# Patient Record
Sex: FEMALE | Race: ASIAN | NOT HISPANIC OR LATINO | URBAN - METROPOLITAN AREA
[De-identification: names, ages, dates, MRNs, and addresses within clinical notes are randomized per-mention and may not be internally consistent; named-entity substitution may affect disease eponyms.]

---

## 2018-09-26 ENCOUNTER — EMERGENCY (EMERGENCY)
Facility: HOSPITAL | Age: 33
LOS: 1 days | Discharge: HOME | End: 2018-09-26
Attending: STUDENT IN AN ORGANIZED HEALTH CARE EDUCATION/TRAINING PROGRAM
Payer: COMMERCIAL

## 2018-09-26 VITALS
TEMPERATURE: 99 F | DIASTOLIC BLOOD PRESSURE: 71 MMHG | HEART RATE: 83 BPM | OXYGEN SATURATION: 100 % | RESPIRATION RATE: 16 BRPM | SYSTOLIC BLOOD PRESSURE: 99 MMHG

## 2018-09-26 VITALS
SYSTOLIC BLOOD PRESSURE: 106 MMHG | HEART RATE: 80 BPM | RESPIRATION RATE: 20 BRPM | OXYGEN SATURATION: 99 % | DIASTOLIC BLOOD PRESSURE: 70 MMHG

## 2018-09-26 LAB
ALBUMIN SERPL ELPH-MCNC: 4 G/DL — SIGNIFICANT CHANGE UP (ref 3.5–5)
ALP SERPL-CCNC: 41 U/L — SIGNIFICANT CHANGE UP (ref 40–120)
ALT FLD-CCNC: 25 U/L DA — SIGNIFICANT CHANGE UP (ref 10–60)
ANION GAP SERPL CALC-SCNC: 8 MMOL/L — SIGNIFICANT CHANGE UP (ref 5–17)
APPEARANCE UR: CLEAR — SIGNIFICANT CHANGE UP
AST SERPL-CCNC: 11 U/L — SIGNIFICANT CHANGE UP (ref 10–40)
BASOPHILS # BLD AUTO: 0.1 K/UL — SIGNIFICANT CHANGE UP (ref 0–0.2)
BASOPHILS NFR BLD AUTO: 0.5 % — SIGNIFICANT CHANGE UP (ref 0–2)
BILIRUB SERPL-MCNC: 0.4 MG/DL — SIGNIFICANT CHANGE UP (ref 0.2–1.2)
BILIRUB UR-MCNC: NEGATIVE — SIGNIFICANT CHANGE UP
BUN SERPL-MCNC: 8 MG/DL — SIGNIFICANT CHANGE UP (ref 7–18)
CALCIUM SERPL-MCNC: 9.4 MG/DL — SIGNIFICANT CHANGE UP (ref 8.4–10.5)
CHLORIDE SERPL-SCNC: 104 MMOL/L — SIGNIFICANT CHANGE UP (ref 96–108)
CO2 SERPL-SCNC: 25 MMOL/L — SIGNIFICANT CHANGE UP (ref 22–31)
COLOR SPEC: YELLOW — SIGNIFICANT CHANGE UP
CREAT SERPL-MCNC: 0.61 MG/DL — SIGNIFICANT CHANGE UP (ref 0.5–1.3)
DIFF PNL FLD: NEGATIVE — SIGNIFICANT CHANGE UP
EOSINOPHIL # BLD AUTO: 0.1 K/UL — SIGNIFICANT CHANGE UP (ref 0–0.5)
EOSINOPHIL NFR BLD AUTO: 0.7 % — SIGNIFICANT CHANGE UP (ref 0–6)
GLUCOSE SERPL-MCNC: 91 MG/DL — SIGNIFICANT CHANGE UP (ref 70–99)
GLUCOSE UR QL: NEGATIVE — SIGNIFICANT CHANGE UP
HCG SERPL-ACNC: HIGH MIU/ML
HCT VFR BLD CALC: 39.1 % — SIGNIFICANT CHANGE UP (ref 34.5–45)
HGB BLD-MCNC: 13 G/DL — SIGNIFICANT CHANGE UP (ref 11.5–15.5)
KETONES UR-MCNC: ABNORMAL
LEUKOCYTE ESTERASE UR-ACNC: ABNORMAL
LYMPHOCYTES # BLD AUTO: 15 % — SIGNIFICANT CHANGE UP (ref 13–44)
LYMPHOCYTES # BLD AUTO: 2.5 K/UL — SIGNIFICANT CHANGE UP (ref 1–3.3)
MCHC RBC-ENTMCNC: 28.9 PG — SIGNIFICANT CHANGE UP (ref 27–34)
MCHC RBC-ENTMCNC: 33.2 GM/DL — SIGNIFICANT CHANGE UP (ref 32–36)
MCV RBC AUTO: 87.3 FL — SIGNIFICANT CHANGE UP (ref 80–100)
MONOCYTES # BLD AUTO: 0.8 K/UL — SIGNIFICANT CHANGE UP (ref 0–0.9)
MONOCYTES NFR BLD AUTO: 5 % — SIGNIFICANT CHANGE UP (ref 2–14)
NEUTROPHILS # BLD AUTO: 12.9 K/UL — HIGH (ref 1.8–7.4)
NEUTROPHILS NFR BLD AUTO: 78.7 % — HIGH (ref 43–77)
NITRITE UR-MCNC: NEGATIVE — SIGNIFICANT CHANGE UP
PH UR: 7 — SIGNIFICANT CHANGE UP (ref 5–8)
PLATELET # BLD AUTO: 317 K/UL — SIGNIFICANT CHANGE UP (ref 150–400)
POTASSIUM SERPL-MCNC: 4.7 MMOL/L — SIGNIFICANT CHANGE UP (ref 3.5–5.3)
POTASSIUM SERPL-SCNC: 4.7 MMOL/L — SIGNIFICANT CHANGE UP (ref 3.5–5.3)
PROT SERPL-MCNC: 8.1 G/DL — SIGNIFICANT CHANGE UP (ref 6–8.3)
PROT UR-MCNC: NEGATIVE — SIGNIFICANT CHANGE UP
RBC # BLD: 4.48 M/UL — SIGNIFICANT CHANGE UP (ref 3.8–5.2)
RBC # FLD: 11 % — SIGNIFICANT CHANGE UP (ref 10.3–14.5)
SODIUM SERPL-SCNC: 137 MMOL/L — SIGNIFICANT CHANGE UP (ref 135–145)
SP GR SPEC: 1.01 — SIGNIFICANT CHANGE UP (ref 1.01–1.02)
UROBILINOGEN FLD QL: NEGATIVE — SIGNIFICANT CHANGE UP
WBC # BLD: 16.4 K/UL — HIGH (ref 3.8–10.5)
WBC # FLD AUTO: 16.4 K/UL — HIGH (ref 3.8–10.5)

## 2018-09-26 PROCEDURE — 99284 EMERGENCY DEPT VISIT MOD MDM: CPT | Mod: 25

## 2018-09-26 PROCEDURE — 96374 THER/PROPH/DIAG INJ IV PUSH: CPT

## 2018-09-26 PROCEDURE — 85027 COMPLETE CBC AUTOMATED: CPT

## 2018-09-26 PROCEDURE — 80053 COMPREHEN METABOLIC PANEL: CPT

## 2018-09-26 PROCEDURE — 99285 EMERGENCY DEPT VISIT HI MDM: CPT

## 2018-09-26 PROCEDURE — 81001 URINALYSIS AUTO W/SCOPE: CPT

## 2018-09-26 PROCEDURE — 87086 URINE CULTURE/COLONY COUNT: CPT

## 2018-09-26 PROCEDURE — 84702 CHORIONIC GONADOTROPIN TEST: CPT

## 2018-09-26 PROCEDURE — 96361 HYDRATE IV INFUSION ADD-ON: CPT

## 2018-09-26 RX ORDER — PYRIDOXINE HCL (VITAMIN B6) 100 MG
1 TABLET ORAL
Qty: 12 | Refills: 0
Start: 2018-09-26 | End: 2018-09-29

## 2018-09-26 RX ORDER — SODIUM CHLORIDE 9 MG/ML
1000 INJECTION, SOLUTION INTRAVENOUS
Qty: 0 | Refills: 0 | Status: DISCONTINUED | OUTPATIENT
Start: 2018-09-26 | End: 2018-09-30

## 2018-09-26 RX ORDER — METOCLOPRAMIDE HCL 10 MG
10 TABLET ORAL ONCE
Qty: 0 | Refills: 0 | Status: COMPLETED | OUTPATIENT
Start: 2018-09-26 | End: 2018-09-26

## 2018-09-26 RX ORDER — SODIUM CHLORIDE 9 MG/ML
1000 INJECTION INTRAMUSCULAR; INTRAVENOUS; SUBCUTANEOUS ONCE
Qty: 0 | Refills: 0 | Status: COMPLETED | OUTPATIENT
Start: 2018-09-26 | End: 2018-09-26

## 2018-09-26 RX ORDER — NITROFURANTOIN MACROCRYSTAL 50 MG
1 CAPSULE ORAL
Qty: 14 | Refills: 0 | OUTPATIENT
Start: 2018-09-26 | End: 2018-10-02

## 2018-09-26 RX ADMIN — Medication 10 MILLIGRAM(S): at 17:27

## 2018-09-26 RX ADMIN — SODIUM CHLORIDE 1000 MILLILITER(S): 9 INJECTION INTRAMUSCULAR; INTRAVENOUS; SUBCUTANEOUS at 18:43

## 2018-09-26 RX ADMIN — SODIUM CHLORIDE 1000 MILLILITER(S): 9 INJECTION INTRAMUSCULAR; INTRAVENOUS; SUBCUTANEOUS at 17:27

## 2018-09-26 RX ADMIN — SODIUM CHLORIDE 125 MILLILITER(S): 9 INJECTION, SOLUTION INTRAVENOUS at 18:43

## 2018-09-26 NOTE — ED ADULT NURSE NOTE - OBJECTIVE STATEMENT
Pt. is c/o nausea vomiting since Monday, Pt. states she is 6 weeks pregnant. Pt. denies any abdominal pain or vaginal bleeding.

## 2018-09-26 NOTE — ED ADULT NURSE NOTE - NSIMPLEMENTINTERV_GEN_ALL_ED
[facility] Pharmacy Note:  Pain Consult    Debora Betancourt Kelly Renee is a 26year old female started on Dilaudid PCA by Dr. Bennett Andrews. Pharmacy was consulted to review medication profile and to discontinue previously ordered narcotics and sedatives.     Medication profile w Implemented All Universal Safety Interventions:  [place] to call system. Call bell, personal items and telephone within reach. Instruct patient to call for assistance. Room bathroom lighting operational. Non-slip footwear when patient is off stretcher. Physically safe environment: no spills, clutter or unnecessary equipment. Stretcher in lowest position, wheels locked, appropriate side rails in place.

## 2018-09-26 NOTE — ED STATDOCS - OBJECTIVE STATEMENT
Telemedicine assessment was conducted (using real time 2 way audio-video technology) by Dr. Shayy Almaguer located at [address] [place] [id number]  +++++++++++++++++++++++++++++++++++++++++++++++++++  History and Plan: 33  y/o F with no significant PMHx or PSHx presents to the ED at 6 weeks gestation with complaints of nausea. Patient reports decreased eating and drinking x 4 days. Reports vomiting more than 6 times a day. Patient had ultrasound done 4 days ago which was reportedly normal. Patient advised to return next week. Patient denies abdominal pain, vaginal bleeding, dysuria or any other complaints. NKDA. Patient taking Diclegis without relief of symptoms. NKDA.  Plan: Give IV fluids, check labs for electrolyte abnormality. Telemedicine assessment was conducted (using real time 2 way audio-video technology) by Dr. Shayy Almaguer located at [address] [place] [id number]  +++++++++++++++++++++++++++++++++++++++++++++++++++  History and Plan: 33  y/o F with no significant PMHx or PSHx presents to the ED at 6 weeks gestation with complaints of nausea and vomiting, multiple episodes daily x 4 days. Pt unable to tolerate po. Reports vomiting more than 6 times a day. Patient had ultrasound done 4 days ago at [facility] which was reportedly normal. Patient advised to return next week for repeat sono. Patient denies abdominal pain, vaginal bleeding, dysuria or any other complaints. NKDA. Patient taking Diclegis without relief of symptoms. NKDA.  On virtual exam pt not in any acute distress and on self exam no abdo ttp.  Plan: Give IV fluids, check labs for electrolyte abnormality, UA, reassess. If pt with abdo pain or vag bleeding will consider TVUS as no documented US on record here.

## 2018-09-26 NOTE — ED PROVIDER NOTE - OBJECTIVE STATEMENT
32 y/o F pt w/ no hx, 6 weeks pregnant, presents c/o nausea, emesis, and dizziness x 3 days. Reports about 10 reports of emesis a day. Reports that she cannot eat. Denies pain, and any other complaints. NKDA.

## 2018-09-26 NOTE — ED PROVIDER NOTE - PROGRESS NOTE DETAILS
patient tolerating po. no abd pain. eelevated wbc likely 2.2 to pregnancy. medication sent to pharmacy.

## 2018-09-26 NOTE — ED PROVIDER NOTE - MEDICAL DECISION MAKING DETAILS
6 weeks pregnant pt w/ emesis, nausea, dizziness. VSS. Well appearing. Will give IV hydration, get UA, and anti-emetics. Reassess.

## 2018-09-27 LAB
CULTURE RESULTS: NO GROWTH — SIGNIFICANT CHANGE UP
SPECIMEN SOURCE: SIGNIFICANT CHANGE UP

## 2018-09-28 ENCOUNTER — EMERGENCY (EMERGENCY)
Facility: HOSPITAL | Age: 33
LOS: 1 days | Discharge: HOME | End: 2018-09-28
Attending: EMERGENCY MEDICINE
Payer: COMMERCIAL

## 2018-09-28 VITALS
OXYGEN SATURATION: 98 % | DIASTOLIC BLOOD PRESSURE: 62 MMHG | TEMPERATURE: 98 F | HEART RATE: 85 BPM | RESPIRATION RATE: 16 BRPM | SYSTOLIC BLOOD PRESSURE: 116 MMHG

## 2018-09-28 VITALS — HEIGHT: 64 IN | WEIGHT: 145.06 LBS

## 2018-09-28 LAB
ALBUMIN SERPL ELPH-MCNC: 4 G/DL — SIGNIFICANT CHANGE UP (ref 3.5–5)
ALP SERPL-CCNC: 42 U/L — SIGNIFICANT CHANGE UP (ref 40–120)
ALT FLD-CCNC: 30 U/L DA — SIGNIFICANT CHANGE UP (ref 10–60)
ANION GAP SERPL CALC-SCNC: 7 MMOL/L — SIGNIFICANT CHANGE UP (ref 5–17)
APPEARANCE UR: CLEAR — SIGNIFICANT CHANGE UP
AST SERPL-CCNC: 18 U/L — SIGNIFICANT CHANGE UP (ref 10–40)
BASOPHILS # BLD AUTO: 0.1 K/UL — SIGNIFICANT CHANGE UP (ref 0–0.2)
BASOPHILS NFR BLD AUTO: 0.3 % — SIGNIFICANT CHANGE UP (ref 0–2)
BILIRUB SERPL-MCNC: 0.5 MG/DL — SIGNIFICANT CHANGE UP (ref 0.2–1.2)
BILIRUB UR-MCNC: NEGATIVE — SIGNIFICANT CHANGE UP
BUN SERPL-MCNC: 6 MG/DL — LOW (ref 7–18)
CALCIUM SERPL-MCNC: 9.9 MG/DL — SIGNIFICANT CHANGE UP (ref 8.4–10.5)
CHLORIDE SERPL-SCNC: 103 MMOL/L — SIGNIFICANT CHANGE UP (ref 96–108)
CO2 SERPL-SCNC: 26 MMOL/L — SIGNIFICANT CHANGE UP (ref 22–31)
COLOR SPEC: YELLOW — SIGNIFICANT CHANGE UP
CREAT SERPL-MCNC: 0.55 MG/DL — SIGNIFICANT CHANGE UP (ref 0.5–1.3)
DIFF PNL FLD: NEGATIVE — SIGNIFICANT CHANGE UP
EOSINOPHIL # BLD AUTO: 0 K/UL — SIGNIFICANT CHANGE UP (ref 0–0.5)
EOSINOPHIL NFR BLD AUTO: 0.1 % — SIGNIFICANT CHANGE UP (ref 0–6)
GLUCOSE SERPL-MCNC: 86 MG/DL — SIGNIFICANT CHANGE UP (ref 70–99)
GLUCOSE UR QL: NEGATIVE — SIGNIFICANT CHANGE UP
HCG SERPL-ACNC: HIGH MIU/ML
HCT VFR BLD CALC: 38.3 % — SIGNIFICANT CHANGE UP (ref 34.5–45)
HGB BLD-MCNC: 12.9 G/DL — SIGNIFICANT CHANGE UP (ref 11.5–15.5)
KETONES UR-MCNC: ABNORMAL
LEUKOCYTE ESTERASE UR-ACNC: ABNORMAL
LYMPHOCYTES # BLD AUTO: 1.1 K/UL — SIGNIFICANT CHANGE UP (ref 1–3.3)
LYMPHOCYTES # BLD AUTO: 6.6 % — LOW (ref 13–44)
MAGNESIUM SERPL-MCNC: 1.8 MG/DL — SIGNIFICANT CHANGE UP (ref 1.6–2.6)
MCHC RBC-ENTMCNC: 29.2 PG — SIGNIFICANT CHANGE UP (ref 27–34)
MCHC RBC-ENTMCNC: 33.6 GM/DL — SIGNIFICANT CHANGE UP (ref 32–36)
MCV RBC AUTO: 87.1 FL — SIGNIFICANT CHANGE UP (ref 80–100)
MONOCYTES # BLD AUTO: 0.6 K/UL — SIGNIFICANT CHANGE UP (ref 0–0.9)
MONOCYTES NFR BLD AUTO: 3.6 % — SIGNIFICANT CHANGE UP (ref 2–14)
NEUTROPHILS # BLD AUTO: 14.8 K/UL — HIGH (ref 1.8–7.4)
NEUTROPHILS NFR BLD AUTO: 89.4 % — HIGH (ref 43–77)
NITRITE UR-MCNC: NEGATIVE — SIGNIFICANT CHANGE UP
PH UR: 8 — SIGNIFICANT CHANGE UP (ref 5–8)
PLATELET # BLD AUTO: 315 K/UL — SIGNIFICANT CHANGE UP (ref 150–400)
POTASSIUM SERPL-MCNC: 4.6 MMOL/L — SIGNIFICANT CHANGE UP (ref 3.5–5.3)
POTASSIUM SERPL-SCNC: 4.6 MMOL/L — SIGNIFICANT CHANGE UP (ref 3.5–5.3)
PROT SERPL-MCNC: 8.2 G/DL — SIGNIFICANT CHANGE UP (ref 6–8.3)
PROT UR-MCNC: NEGATIVE — SIGNIFICANT CHANGE UP
RBC # BLD: 4.4 M/UL — SIGNIFICANT CHANGE UP (ref 3.8–5.2)
RBC # FLD: 11 % — SIGNIFICANT CHANGE UP (ref 10.3–14.5)
SODIUM SERPL-SCNC: 136 MMOL/L — SIGNIFICANT CHANGE UP (ref 135–145)
SP GR SPEC: 1.01 — SIGNIFICANT CHANGE UP (ref 1.01–1.02)
UROBILINOGEN FLD QL: NEGATIVE — SIGNIFICANT CHANGE UP
WBC # BLD: 16.6 K/UL — HIGH (ref 3.8–10.5)
WBC # FLD AUTO: 16.6 K/UL — HIGH (ref 3.8–10.5)

## 2018-09-28 PROCEDURE — 80053 COMPREHEN METABOLIC PANEL: CPT

## 2018-09-28 PROCEDURE — 99284 EMERGENCY DEPT VISIT MOD MDM: CPT | Mod: 25

## 2018-09-28 PROCEDURE — 84702 CHORIONIC GONADOTROPIN TEST: CPT

## 2018-09-28 PROCEDURE — 85027 COMPLETE CBC AUTOMATED: CPT

## 2018-09-28 PROCEDURE — 87086 URINE CULTURE/COLONY COUNT: CPT

## 2018-09-28 PROCEDURE — 81001 URINALYSIS AUTO W/SCOPE: CPT

## 2018-09-28 PROCEDURE — 99284 EMERGENCY DEPT VISIT MOD MDM: CPT

## 2018-09-28 PROCEDURE — 96376 TX/PRO/DX INJ SAME DRUG ADON: CPT

## 2018-09-28 PROCEDURE — 83735 ASSAY OF MAGNESIUM: CPT

## 2018-09-28 PROCEDURE — 96374 THER/PROPH/DIAG INJ IV PUSH: CPT

## 2018-09-28 PROCEDURE — 96375 TX/PRO/DX INJ NEW DRUG ADDON: CPT

## 2018-09-28 RX ORDER — ONDANSETRON 8 MG/1
4 TABLET, FILM COATED ORAL ONCE
Qty: 0 | Refills: 0 | Status: COMPLETED | OUTPATIENT
Start: 2018-09-28 | End: 2018-09-28

## 2018-09-28 RX ORDER — CEFTRIAXONE 500 MG/1
1 INJECTION, POWDER, FOR SOLUTION INTRAMUSCULAR; INTRAVENOUS ONCE
Qty: 0 | Refills: 0 | Status: COMPLETED | OUTPATIENT
Start: 2018-09-28 | End: 2018-09-28

## 2018-09-28 RX ORDER — ONDANSETRON 8 MG/1
1 TABLET, FILM COATED ORAL
Qty: 15 | Refills: 0
Start: 2018-09-28 | End: 2018-10-02

## 2018-09-28 RX ORDER — SODIUM CHLORIDE 9 MG/ML
2000 INJECTION INTRAMUSCULAR; INTRAVENOUS; SUBCUTANEOUS ONCE
Qty: 0 | Refills: 0 | Status: COMPLETED | OUTPATIENT
Start: 2018-09-28 | End: 2018-09-28

## 2018-09-28 RX ADMIN — ONDANSETRON 4 MILLIGRAM(S): 8 TABLET, FILM COATED ORAL at 16:09

## 2018-09-28 RX ADMIN — SODIUM CHLORIDE 2000 MILLILITER(S): 9 INJECTION INTRAMUSCULAR; INTRAVENOUS; SUBCUTANEOUS at 14:34

## 2018-09-28 RX ADMIN — ONDANSETRON 4 MILLIGRAM(S): 8 TABLET, FILM COATED ORAL at 14:39

## 2018-09-28 RX ADMIN — CEFTRIAXONE 100 GRAM(S): 500 INJECTION, POWDER, FOR SOLUTION INTRAMUSCULAR; INTRAVENOUS at 16:09

## 2018-09-28 NOTE — ED ADULT NURSE NOTE - NSIMPLEMENTINTERV_GEN_ALL_ED
Implemented All Universal Safety Interventions:  [place] to call system. Call bell, personal items and telephone within reach. Instruct patient to call for assistance. Room bathroom lighting operational. Non-slip footwear when patient is off stretcher. Physically safe environment: no spills, clutter or unnecessary equipment. Stretcher in lowest position, wheels locked, appropriate side rails in place.

## 2018-09-28 NOTE — ED PROVIDER NOTE - CARE PLAN
Principal Discharge DX:	Hyperemesis gravidarum Principal Discharge DX:	Hyperemesis gravidarum  Secondary Diagnosis:	Bacteriuria, asymptomatic, in pregnancy

## 2018-09-28 NOTE — ED PROVIDER NOTE - MEDICAL DECISION MAKING DETAILS
patient pw hyperemesis gravidarum. will start on fluids and anti-emetic's. given she has failed vitb6, will start on zofran. risks to fetus d/w patient and she agrees to take the medication. patient pw hyperemesis gravidarum. will start on fluids and anti-emetic's. given she has failed vitb6, will start on zofran. risks to fetus d/w patient and she agrees to take the medication. will give iv dosage abx for uti. patient okay for dc home.

## 2018-09-28 NOTE — ED ADULT NURSE NOTE - OBJECTIVE STATEMENT
6 weeks pregnant came in c/o vomiting was diagnosed with hyper emesis gravidarum, gerber any vaginal bleeding abdominal pain or cramps

## 2018-09-28 NOTE — ED PROVIDER NOTE - OBJECTIVE STATEMENT
Pertinent PMH/PSH/FHx/SHx and Review of Systems contained within:  33F no med hx g1 at 7 weeks pw nausea and vomiting. Seen on the 27th and dxed with hyperemesis gravidarum. patient notes initial relief and she was sent home on pyridoxime. symptoms returned. patient notes having very little in her system. no fever, chills, dysuria, vaginal bleeding, weakness, cp, sob, rash, dizziness   Fh and Sh not otherwise contributory  ROS otherwise negative Pertinent PMH/PSH/FHx/SHx and Review of Systems contained within:  33F no med hx g1 at 7 weeks pw nausea and vomiting. Seen on the 27th and dxed with hyperemesis gravidarum. patient notes initial relief and she was sent home on pyridoxime and diclegis. symptoms returned. patient notes having very little in her system. no fever, chills, dysuria, vaginal bleeding, weakness, cp, sob, rash, dizziness. she denies dysuria but was notably started on macrobid, which she did not take 2/2 nausea.   Fh and Sh not otherwise contributory  ROS otherwise negative

## 2018-09-29 LAB
CULTURE RESULTS: NO GROWTH — SIGNIFICANT CHANGE UP
SPECIMEN SOURCE: SIGNIFICANT CHANGE UP

## 2018-10-03 ENCOUNTER — EMERGENCY (EMERGENCY)
Facility: HOSPITAL | Age: 33
LOS: 1 days | Discharge: HOME | End: 2018-10-03
Attending: EMERGENCY MEDICINE | Admitting: EMERGENCY MEDICINE
Payer: COMMERCIAL

## 2018-10-03 VITALS
TEMPERATURE: 99 F | RESPIRATION RATE: 18 BRPM | HEART RATE: 84 BPM | DIASTOLIC BLOOD PRESSURE: 72 MMHG | SYSTOLIC BLOOD PRESSURE: 105 MMHG | OXYGEN SATURATION: 100 %

## 2018-10-03 VITALS
DIASTOLIC BLOOD PRESSURE: 81 MMHG | SYSTOLIC BLOOD PRESSURE: 130 MMHG | HEART RATE: 82 BPM | OXYGEN SATURATION: 100 % | RESPIRATION RATE: 16 BRPM | TEMPERATURE: 98 F

## 2018-10-03 LAB
BUN SERPL-MCNC: 7 MG/DL — SIGNIFICANT CHANGE UP (ref 7–23)
CALCIUM SERPL-MCNC: 9.3 MG/DL — SIGNIFICANT CHANGE UP (ref 8.4–10.5)
CHLORIDE SERPL-SCNC: 97 MMOL/L — LOW (ref 98–107)
CO2 SERPL-SCNC: 24 MMOL/L — SIGNIFICANT CHANGE UP (ref 22–31)
CREAT SERPL-MCNC: 0.64 MG/DL — SIGNIFICANT CHANGE UP (ref 0.5–1.3)
GLUCOSE SERPL-MCNC: 86 MG/DL — SIGNIFICANT CHANGE UP (ref 70–99)
HCT VFR BLD CALC: 36.9 % — SIGNIFICANT CHANGE UP (ref 34.5–45)
HGB BLD-MCNC: 12.7 G/DL — SIGNIFICANT CHANGE UP (ref 11.5–15.5)
MAGNESIUM SERPL-MCNC: 1.9 MG/DL — SIGNIFICANT CHANGE UP (ref 1.6–2.6)
MCHC RBC-ENTMCNC: 29.6 PG — SIGNIFICANT CHANGE UP (ref 27–34)
MCHC RBC-ENTMCNC: 34.4 % — SIGNIFICANT CHANGE UP (ref 32–36)
MCV RBC AUTO: 86 FL — SIGNIFICANT CHANGE UP (ref 80–100)
NRBC # FLD: 0 — SIGNIFICANT CHANGE UP
PHOSPHATE SERPL-MCNC: 3.8 MG/DL — SIGNIFICANT CHANGE UP (ref 2.5–4.5)
PLATELET # BLD AUTO: 313 K/UL — SIGNIFICANT CHANGE UP (ref 150–400)
PMV BLD: 9.7 FL — SIGNIFICANT CHANGE UP (ref 7–13)
POTASSIUM SERPL-MCNC: 4 MMOL/L — SIGNIFICANT CHANGE UP (ref 3.5–5.3)
POTASSIUM SERPL-SCNC: 4 MMOL/L — SIGNIFICANT CHANGE UP (ref 3.5–5.3)
RBC # BLD: 4.29 M/UL — SIGNIFICANT CHANGE UP (ref 3.8–5.2)
RBC # FLD: 12 % — SIGNIFICANT CHANGE UP (ref 10.3–14.5)
SODIUM SERPL-SCNC: 133 MMOL/L — LOW (ref 135–145)
WBC # BLD: 14.1 K/UL — HIGH (ref 3.8–10.5)
WBC # FLD AUTO: 14.1 K/UL — HIGH (ref 3.8–10.5)

## 2018-10-03 PROCEDURE — 99284 EMERGENCY DEPT VISIT MOD MDM: CPT | Mod: 25

## 2018-10-03 PROCEDURE — 76815 OB US LIMITED FETUS(S): CPT | Mod: 26

## 2018-10-03 RX ORDER — METOCLOPRAMIDE HCL 10 MG
1 TABLET ORAL
Qty: 21 | Refills: 0
Start: 2018-10-03 | End: 2018-10-09

## 2018-10-03 RX ORDER — SODIUM CHLORIDE 9 MG/ML
2000 INJECTION INTRAMUSCULAR; INTRAVENOUS; SUBCUTANEOUS ONCE
Qty: 0 | Refills: 0 | Status: COMPLETED | OUTPATIENT
Start: 2018-10-03 | End: 2018-10-03

## 2018-10-03 RX ORDER — SODIUM CHLORIDE 9 MG/ML
2000 INJECTION, SOLUTION INTRAVENOUS ONCE
Qty: 0 | Refills: 0 | Status: COMPLETED | OUTPATIENT
Start: 2018-10-03 | End: 2018-10-03

## 2018-10-03 RX ORDER — METOCLOPRAMIDE HCL 10 MG
10 TABLET ORAL ONCE
Qty: 0 | Refills: 0 | Status: COMPLETED | OUTPATIENT
Start: 2018-10-03 | End: 2018-10-03

## 2018-10-03 RX ORDER — ONDANSETRON 8 MG/1
4 TABLET, FILM COATED ORAL ONCE
Qty: 0 | Refills: 0 | Status: COMPLETED | OUTPATIENT
Start: 2018-10-03 | End: 2018-10-03

## 2018-10-03 RX ADMIN — SODIUM CHLORIDE 1000 MILLILITER(S): 9 INJECTION, SOLUTION INTRAVENOUS at 15:54

## 2018-10-03 RX ADMIN — SODIUM CHLORIDE 1000 MILLILITER(S): 9 INJECTION INTRAMUSCULAR; INTRAVENOUS; SUBCUTANEOUS at 13:27

## 2018-10-03 RX ADMIN — Medication 10 MILLIGRAM(S): at 15:54

## 2018-10-03 RX ADMIN — ONDANSETRON 4 MILLIGRAM(S): 8 TABLET, FILM COATED ORAL at 13:27

## 2018-10-03 NOTE — ED PROVIDER NOTE - MEDICAL DECISION MAKING DETAILS
Polo: Recurrent hyperemesis gravidarum. Try Zofran IV. If that fail, try Reglan. Check electrolytes. IVF.

## 2018-10-03 NOTE — ED PROVIDER NOTE - PLAN OF CARE
Take medications as prescribed. Follow up with your healthcare provider about this issue (these issues): hyperemesis gravidarum. May take Zofran every 8 hours and/or Reglan every 6 to 8 hours, either at the same time or alternate them. Return if symptoms persist. Eating and drinking small amounts of food and liquid may help.

## 2018-10-03 NOTE — ED ADULT NURSE NOTE - CHIEF COMPLAINT QUOTE
Pt. 7.5 wks pregnant, c/o abdominal pain and vomiting x 2 weeks. Seen at [facility] on 9/28 and given Zofran. States Zofran not helping anymore. Denies diarrhea, vaginal bleeding or fevers. US done for IUP confirmation.

## 2018-10-03 NOTE — ED ADULT NURSE NOTE - OBJECTIVE STATEMENT
Pt. 7.5 wks pregnant, c/o abdominal pain and vomiting x 2 weeks. Seen at [facility] on 9/28 and given Zofran. States Zofran not helping anymore. Denies diarrhea, vaginal bleeding or fevers.

## 2018-10-03 NOTE — ED ADULT NURSE NOTE - NSIMPLEMENTINTERV_GEN_ALL_ED
Implemented All Universal Safety Interventions:  [place] to call system. Call bell, personal items and telephone within reach. Instruct patient to call for assistance. Room bathroom lighting operational. Non-slip footwear when patient is off stretcher. Physically safe environment: no spills, clutter or unnecessary equipment. Stretcher in lowest position, wheels locked, appropriate side rails in place.

## 2018-10-03 NOTE — ED ADULT TRIAGE NOTE - CHIEF COMPLAINT QUOTE
Pt. 7.5 wks pregnant, c/o abdominal pain and vomiting x 2 weeks. Seen at [facility] on 9/28 and given Zofran. States Zofran not helping anymore. Denies diarrhea, vaginal bleeding or fevers. US done for IUP confirmation.

## 2018-10-03 NOTE — ED PROVIDER NOTE - OBJECTIVE STATEMENT
Polo: 33 F, 7.5 wks preg, hyperemesis gravidarum. Seen 5 days ago at other hosp., got Zofran (failed Diclegys). Zofran worked for 2 days, then return of N/V to point she can't keep food or liquid down. No F/pain/bleeding. First pregnancy. OB = [facility]. Dizzy, no LOC.

## 2018-10-03 NOTE — ED PROVIDER NOTE - CARE PLAN
Principal Discharge DX:	Hyperemesis gravidarum before end of 22 week gestation with carbohydrate depletion Principal Discharge DX:	Hyperemesis gravidarum before end of 22 week gestation with carbohydrate depletion  Assessment and plan of treatment:	Take medications as prescribed. Follow up with your healthcare provider about this issue (these issues): hyperemesis gravidarum. May take Zofran every 8 hours and/or Reglan every 6 to 8 hours, either at the same time or alternate them. Return if symptoms persist. Eating and drinking small amounts of food and liquid may help.

## 2018-10-09 ENCOUNTER — EMERGENCY (EMERGENCY)
Facility: HOSPITAL | Age: 33
LOS: 1 days | Discharge: HOME | End: 2018-10-09
Attending: EMERGENCY MEDICINE | Admitting: EMERGENCY MEDICINE
Payer: COMMERCIAL

## 2018-10-09 VITALS
TEMPERATURE: 98 F | DIASTOLIC BLOOD PRESSURE: 66 MMHG | RESPIRATION RATE: 16 BRPM | HEART RATE: 81 BPM | SYSTOLIC BLOOD PRESSURE: 116 MMHG | OXYGEN SATURATION: 99 %

## 2018-10-09 PROBLEM — O21.1 HYPEREMESIS GRAVIDARUM WITH METABOLIC DISTURBANCE: Chronic | Status: ACTIVE | Noted: 2018-10-03

## 2018-10-09 LAB
ALBUMIN SERPL ELPH-MCNC: 4.1 G/DL — SIGNIFICANT CHANGE UP (ref 3.3–5)
ALP SERPL-CCNC: 38 U/L — LOW (ref 40–120)
ALT FLD-CCNC: 10 U/L — SIGNIFICANT CHANGE UP (ref 4–33)
APPEARANCE UR: CLEAR — SIGNIFICANT CHANGE UP
AST SERPL-CCNC: 16 U/L — SIGNIFICANT CHANGE UP (ref 4–32)
BACTERIA # UR AUTO: NEGATIVE — SIGNIFICANT CHANGE UP
BASOPHILS # BLD AUTO: 0.02 K/UL — SIGNIFICANT CHANGE UP (ref 0–0.2)
BASOPHILS NFR BLD AUTO: 0.2 % — SIGNIFICANT CHANGE UP (ref 0–2)
BILIRUB SERPL-MCNC: 0.5 MG/DL — SIGNIFICANT CHANGE UP (ref 0.2–1.2)
BILIRUB UR-MCNC: NEGATIVE — SIGNIFICANT CHANGE UP
BLOOD UR QL VISUAL: NEGATIVE — SIGNIFICANT CHANGE UP
BUN SERPL-MCNC: 8 MG/DL — SIGNIFICANT CHANGE UP (ref 7–23)
CALCIUM SERPL-MCNC: 9.2 MG/DL — SIGNIFICANT CHANGE UP (ref 8.4–10.5)
CHLORIDE SERPL-SCNC: 99 MMOL/L — SIGNIFICANT CHANGE UP (ref 98–107)
CO2 SERPL-SCNC: 22 MMOL/L — SIGNIFICANT CHANGE UP (ref 22–31)
COLOR SPEC: YELLOW — SIGNIFICANT CHANGE UP
CREAT SERPL-MCNC: 0.55 MG/DL — SIGNIFICANT CHANGE UP (ref 0.5–1.3)
EOSINOPHIL # BLD AUTO: 0.09 K/UL — SIGNIFICANT CHANGE UP (ref 0–0.5)
EOSINOPHIL NFR BLD AUTO: 0.7 % — SIGNIFICANT CHANGE UP (ref 0–6)
GLUCOSE SERPL-MCNC: 80 MG/DL — SIGNIFICANT CHANGE UP (ref 70–99)
GLUCOSE UR-MCNC: NEGATIVE — SIGNIFICANT CHANGE UP
HCG SERPL-ACNC: SIGNIFICANT CHANGE UP MIU/ML
HCT VFR BLD CALC: 34.2 % — LOW (ref 34.5–45)
HGB BLD-MCNC: 11.7 G/DL — SIGNIFICANT CHANGE UP (ref 11.5–15.5)
HYALINE CASTS # UR AUTO: NEGATIVE — SIGNIFICANT CHANGE UP
IMM GRANULOCYTES # BLD AUTO: 0.06 # — SIGNIFICANT CHANGE UP
IMM GRANULOCYTES NFR BLD AUTO: 0.5 % — SIGNIFICANT CHANGE UP (ref 0–1.5)
KETONES UR-MCNC: >150 — HIGH
LEUKOCYTE ESTERASE UR-ACNC: NEGATIVE — SIGNIFICANT CHANGE UP
LYMPHOCYTES # BLD AUTO: 17.8 % — SIGNIFICANT CHANGE UP (ref 13–44)
LYMPHOCYTES # BLD AUTO: 2.36 K/UL — SIGNIFICANT CHANGE UP (ref 1–3.3)
MCHC RBC-ENTMCNC: 28.6 PG — SIGNIFICANT CHANGE UP (ref 27–34)
MCHC RBC-ENTMCNC: 34.2 % — SIGNIFICANT CHANGE UP (ref 32–36)
MCV RBC AUTO: 83.6 FL — SIGNIFICANT CHANGE UP (ref 80–100)
MONOCYTES # BLD AUTO: 0.72 K/UL — SIGNIFICANT CHANGE UP (ref 0–0.9)
MONOCYTES NFR BLD AUTO: 5.4 % — SIGNIFICANT CHANGE UP (ref 2–14)
NEUTROPHILS # BLD AUTO: 10.04 K/UL — HIGH (ref 1.8–7.4)
NEUTROPHILS NFR BLD AUTO: 75.4 % — SIGNIFICANT CHANGE UP (ref 43–77)
NITRITE UR-MCNC: NEGATIVE — SIGNIFICANT CHANGE UP
NRBC # FLD: 0 — SIGNIFICANT CHANGE UP
PH UR: 7 — SIGNIFICANT CHANGE UP (ref 5–8)
PLATELET # BLD AUTO: 325 K/UL — SIGNIFICANT CHANGE UP (ref 150–400)
PMV BLD: 9.6 FL — SIGNIFICANT CHANGE UP (ref 7–13)
POTASSIUM SERPL-MCNC: 5 MMOL/L — SIGNIFICANT CHANGE UP (ref 3.5–5.3)
POTASSIUM SERPL-SCNC: 5 MMOL/L — SIGNIFICANT CHANGE UP (ref 3.5–5.3)
PROT SERPL-MCNC: 7.3 G/DL — SIGNIFICANT CHANGE UP (ref 6–8.3)
PROT UR-MCNC: 50 — SIGNIFICANT CHANGE UP
RBC # BLD: 4.09 M/UL — SIGNIFICANT CHANGE UP (ref 3.8–5.2)
RBC # FLD: 12.2 % — SIGNIFICANT CHANGE UP (ref 10.3–14.5)
RBC CASTS # UR COMP ASSIST: SIGNIFICANT CHANGE UP (ref 0–?)
SODIUM SERPL-SCNC: 133 MMOL/L — LOW (ref 135–145)
SP GR SPEC: 1.03 — SIGNIFICANT CHANGE UP (ref 1–1.04)
SQUAMOUS # UR AUTO: SIGNIFICANT CHANGE UP
UROBILINOGEN FLD QL: SIGNIFICANT CHANGE UP
WBC # BLD: 13.29 K/UL — HIGH (ref 3.8–10.5)
WBC # FLD AUTO: 13.29 K/UL — HIGH (ref 3.8–10.5)
WBC UR QL: SIGNIFICANT CHANGE UP (ref 0–?)

## 2018-10-09 PROCEDURE — 99218: CPT

## 2018-10-09 RX ORDER — SODIUM CHLORIDE 9 MG/ML
1000 INJECTION INTRAMUSCULAR; INTRAVENOUS; SUBCUTANEOUS ONCE
Qty: 0 | Refills: 0 | Status: COMPLETED | OUTPATIENT
Start: 2018-10-09 | End: 2018-10-09

## 2018-10-09 RX ORDER — SODIUM CHLORIDE 9 MG/ML
1000 INJECTION, SOLUTION INTRAVENOUS
Qty: 0 | Refills: 0 | Status: COMPLETED | OUTPATIENT
Start: 2018-10-09 | End: 2018-10-09

## 2018-10-09 RX ORDER — SODIUM CHLORIDE 9 MG/ML
1000 INJECTION, SOLUTION INTRAVENOUS
Qty: 0 | Refills: 0 | Status: DISCONTINUED | OUTPATIENT
Start: 2018-10-09 | End: 2018-10-13

## 2018-10-09 RX ORDER — ONDANSETRON 8 MG/1
4 TABLET, FILM COATED ORAL ONCE
Qty: 0 | Refills: 0 | Status: COMPLETED | OUTPATIENT
Start: 2018-10-09 | End: 2018-10-09

## 2018-10-09 RX ORDER — ONDANSETRON 8 MG/1
8 TABLET, FILM COATED ORAL ONCE
Qty: 0 | Refills: 0 | Status: DISCONTINUED | OUTPATIENT
Start: 2018-10-09 | End: 2018-10-09

## 2018-10-09 RX ORDER — FAMOTIDINE 10 MG/ML
20 INJECTION INTRAVENOUS EVERY 12 HOURS
Qty: 0 | Refills: 0 | Status: DISCONTINUED | OUTPATIENT
Start: 2018-10-10 | End: 2018-10-13

## 2018-10-09 RX ORDER — FAMOTIDINE 10 MG/ML
20 INJECTION INTRAVENOUS ONCE
Qty: 0 | Refills: 0 | Status: COMPLETED | OUTPATIENT
Start: 2018-10-09 | End: 2018-10-09

## 2018-10-09 RX ADMIN — SODIUM CHLORIDE 125 MILLILITER(S): 9 INJECTION, SOLUTION INTRAVENOUS at 23:19

## 2018-10-09 RX ADMIN — Medication 25 MILLIGRAM(S): at 18:17

## 2018-10-09 RX ADMIN — SODIUM CHLORIDE 1000 MILLILITER(S): 9 INJECTION INTRAMUSCULAR; INTRAVENOUS; SUBCUTANEOUS at 17:40

## 2018-10-09 RX ADMIN — ONDANSETRON 4 MILLIGRAM(S): 8 TABLET, FILM COATED ORAL at 20:20

## 2018-10-09 RX ADMIN — SODIUM CHLORIDE 150 MILLILITER(S): 9 INJECTION, SOLUTION INTRAVENOUS at 18:09

## 2018-10-09 RX ADMIN — FAMOTIDINE 20 MILLIGRAM(S): 10 INJECTION INTRAVENOUS at 23:24

## 2018-10-09 NOTE — ED PROVIDER NOTE - PROGRESS NOTE DETAILS
sw gyn can consider phenergan suppository since failed others Still co some nausea.   Will add on Zofran IV and re-eval  ? CDU

## 2018-10-09 NOTE — ED PROVIDER NOTE - OBJECTIVE STATEMENT
Polo: 33 F, approx 8 weeks preg, p/w hyperemesis gravidarum. Seen one week ago for the same sx, placed on reglan at the time which was added to her zofran that she has been taking for a few weeks now. No relief with both medications.  (  Failed diglegys) ,Unable to keep food or liquids down with the zofran and reglan persistently.   Saw her gyn yesterday, who did a sonogram which was okay. Advised to increased her zofran to 8 mg instead of 4 mg and still no improvement.   Denies any fevers, chills, bleeding, abd pain, urinary, dizziness, LOC,  or other gyn complaints.  Patients First pregnancy. OB = [facility] [place]. Polo: 33 F, approx 8 weeks preg, p/w hyperemesis gravidarum. Seen one week ago for the same sx, placed on reglan at the time which was added to her zofran that she has been taking for a few weeks now. No relief with both medications.  (  Failed diglegys) ,Unable to keep food or liquids down with the zofran and reglan persistently.   Saw her gyn yesterday, who did a sonogram which was okay. Advised to increased her zofran to 8 mg instead of 4 mg and still no improvement.   Denies any fevers, chills, bleeding, abd pain, urinary, dizziness, LOC,  or other gyn complaints.  Patients First pregnancy. OB = [facility].     : 33 F, approx 8 weeks preg, p/w hyperemesis gravidarum. Seen one week ago for the same sx, placed on reglan at the time which was added to her zofran that she has been taking for a few weeks now. No relief with both medications.  (  Failed diglegys) ,Unable to keep food or liquids down with the zofran and reglan persistently.   Saw her gyn yesterday, who did a sonogram which was okay. Advised to increased her zofran to 8 mg instead of 4 mg and still no improvement.   Denies any fevers, chills, bleeding, abd pain, urinary, dizziness, LOC,  or other gyn complaints.  Patients First pregnancy. OB = [facility] [place].

## 2018-10-09 NOTE — ED PROVIDER NOTE - MEDICAL DECISION MAKING DETAILS
Hyperemesis:   Fluids,  Labs,  Anti-emetics, re-eval Hyperemesis:   Fluids,  Labs,  ua/culture, Anti-emetics, re-eval

## 2018-10-09 NOTE — ED CDU PROVIDER INITIAL DAY NOTE - OBJECTIVE STATEMENT
: 33 F, approx 8 weeks preg, p/w hyperemesis gravidarum. Seen one week ago for the same sx, placed on reglan at the time which was added to her zofran that she has been taking for a few weeks now. No relief with both medications.  (  Failed diglegys) ,Unable to keep food or liquids down with the zofran and reglan persistently.   Saw her gyn yesterday, who did a sonogram which was okay. Advised to increased her zofran to 8 mg instead of 4 mg and still no improvement.   Denies any fevers, chills, bleeding, abd pain, urinary, dizziness, LOC,  or other gyn complaints.  Patients First pregnancy. OB = [facility] [facility] [place].      CDU CHASE Smith Note----  32 yo female, no stated PMH, currently at approx. 8 weeks GA confirmed by outpatient US; pt presenting to the ED for nausea and vomiting.  Pt. states she has had 4 prior visits in recent weeks for same symptoms, as per above.  Pt. was evaluated in the ED and dispo'd to CDU for IV hydration and supportive care.  On CDU arrival, pt states she is feeling slight improvement and is taking small slow sips of apple juice.  No hx/o vaginal bleeding / spotting / cramping.  No hx/o fevers, chills, or other recent illness.  No other c/o.

## 2018-10-09 NOTE — ED ADULT NURSE NOTE - OBJECTIVE STATEMENT
received pt to INT9 c/o hyperemesis for weeks, pt 8 weeks prego, denies vag bleeding, states has stomach cramps when vomiting, VSS, IV access rt ac 20g, labs sent, urine sent, 1L NS bolus initiated, banana bag iniitiated, will monitor.

## 2018-10-09 NOTE — ED PROVIDER NOTE - CARE PLAN
Principal Discharge DX:	Hyperemesis gravidarum before end of 22 week gestation with carbohydrate depletion

## 2018-10-09 NOTE — ED PROVIDER NOTE - ATTENDING CONTRIBUTION TO CARE
34 y/o F  at approx 8 weeks gestation, confirmed IUP here with nausea and vomiting.  Pt with mult previous visits to ER for same, previously started on diclegis, zofran without relief.  Pt reports cont nausea , unable to tolerate PO.  No fever, chills, abd pain, diarrhea, urinary sxs.  Well appearing, lying comfortably in stretcher, awake and alert, nontoxic.  VSS.  Dry mucous membranes.  Lungs cta bl.  Cards nl S1/S2, RRR, no MRG.  Abd soft ntnd.  No pedal edema or calf tenderness.  Likely hyperemesis, will check labs, iv hydration, antitemetics reassess.

## 2018-10-09 NOTE — ED ADULT NURSE NOTE - ED STAT RN HANDOFF DETAILS
Rpt to CDU CLEMENCIA Martin.  Pt aaox4, vitals being done by RN, ambulatory, coming into obs for Hyperemesis Gravidarium.  Clear liquids only at present.  Awaiting further orders.  Pt stable.

## 2018-10-09 NOTE — ED ADULT NURSE NOTE - NSIMPLEMENTINTERV_GEN_ALL_ED
Implemented All Universal Safety Interventions:  [place] to call system. Call bell, personal items and telephone within reach. Instruct patient to call for assistance. Room bathroom lighting operational. Non-slip footwear when patient is off stretcher. Physically safe environment: no spills, clutter or unnecessary equipment. Stretcher in lowest position, wheels locked, appropriate side rails in place.

## 2018-10-09 NOTE — ED CDU PROVIDER INITIAL DAY NOTE - INDICATION FOR OBSERVATION
IV hydration, antiemetics PRN, supportive care, general observation care / monitoring./Therapeutic Intensity

## 2018-10-10 VITALS
RESPIRATION RATE: 16 BRPM | HEART RATE: 72 BPM | DIASTOLIC BLOOD PRESSURE: 69 MMHG | TEMPERATURE: 98 F | SYSTOLIC BLOOD PRESSURE: 109 MMHG | OXYGEN SATURATION: 100 %

## 2018-10-10 LAB
ALBUMIN SERPL ELPH-MCNC: 3 G/DL — LOW (ref 3.3–5)
ALP SERPL-CCNC: 29 U/L — LOW (ref 40–120)
ALT FLD-CCNC: 10 U/L — SIGNIFICANT CHANGE UP (ref 4–33)
AST SERPL-CCNC: 13 U/L — SIGNIFICANT CHANGE UP (ref 4–32)
BASOPHILS # BLD AUTO: 0.02 K/UL — SIGNIFICANT CHANGE UP (ref 0–0.2)
BASOPHILS NFR BLD AUTO: 0.2 % — SIGNIFICANT CHANGE UP (ref 0–2)
BILIRUB SERPL-MCNC: 0.4 MG/DL — SIGNIFICANT CHANGE UP (ref 0.2–1.2)
BUN SERPL-MCNC: 3 MG/DL — LOW (ref 7–23)
CALCIUM SERPL-MCNC: 7.9 MG/DL — LOW (ref 8.4–10.5)
CHLORIDE SERPL-SCNC: 105 MMOL/L — SIGNIFICANT CHANGE UP (ref 98–107)
CO2 SERPL-SCNC: 19 MMOL/L — LOW (ref 22–31)
CREAT SERPL-MCNC: 0.53 MG/DL — SIGNIFICANT CHANGE UP (ref 0.5–1.3)
EOSINOPHIL # BLD AUTO: 0.12 K/UL — SIGNIFICANT CHANGE UP (ref 0–0.5)
EOSINOPHIL NFR BLD AUTO: 1.3 % — SIGNIFICANT CHANGE UP (ref 0–6)
GLUCOSE SERPL-MCNC: 106 MG/DL — HIGH (ref 70–99)
HCT VFR BLD CALC: 27.7 % — LOW (ref 34.5–45)
HGB BLD-MCNC: 9.7 G/DL — LOW (ref 11.5–15.5)
IMM GRANULOCYTES # BLD AUTO: 0.03 # — SIGNIFICANT CHANGE UP
IMM GRANULOCYTES NFR BLD AUTO: 0.3 % — SIGNIFICANT CHANGE UP (ref 0–1.5)
LYMPHOCYTES # BLD AUTO: 1.92 K/UL — SIGNIFICANT CHANGE UP (ref 1–3.3)
LYMPHOCYTES # BLD AUTO: 21.1 % — SIGNIFICANT CHANGE UP (ref 13–44)
MAGNESIUM SERPL-MCNC: 1.8 MG/DL — SIGNIFICANT CHANGE UP (ref 1.6–2.6)
MCHC RBC-ENTMCNC: 30.2 PG — SIGNIFICANT CHANGE UP (ref 27–34)
MCHC RBC-ENTMCNC: 35 % — SIGNIFICANT CHANGE UP (ref 32–36)
MCV RBC AUTO: 86.3 FL — SIGNIFICANT CHANGE UP (ref 80–100)
MONOCYTES # BLD AUTO: 0.6 K/UL — SIGNIFICANT CHANGE UP (ref 0–0.9)
MONOCYTES NFR BLD AUTO: 6.6 % — SIGNIFICANT CHANGE UP (ref 2–14)
NEUTROPHILS # BLD AUTO: 6.4 K/UL — SIGNIFICANT CHANGE UP (ref 1.8–7.4)
NEUTROPHILS NFR BLD AUTO: 70.5 % — SIGNIFICANT CHANGE UP (ref 43–77)
NRBC # FLD: 0 — SIGNIFICANT CHANGE UP
PLATELET # BLD AUTO: 257 K/UL — SIGNIFICANT CHANGE UP (ref 150–400)
PMV BLD: 9.7 FL — SIGNIFICANT CHANGE UP (ref 7–13)
POTASSIUM SERPL-MCNC: 3.6 MMOL/L — SIGNIFICANT CHANGE UP (ref 3.5–5.3)
POTASSIUM SERPL-SCNC: 3.6 MMOL/L — SIGNIFICANT CHANGE UP (ref 3.5–5.3)
PROT SERPL-MCNC: 5.4 G/DL — LOW (ref 6–8.3)
RBC # BLD: 3.21 M/UL — LOW (ref 3.8–5.2)
RBC # FLD: 12.2 % — SIGNIFICANT CHANGE UP (ref 10.3–14.5)
SODIUM SERPL-SCNC: 134 MMOL/L — LOW (ref 135–145)
WBC # BLD: 9.09 K/UL — SIGNIFICANT CHANGE UP (ref 3.8–10.5)
WBC # FLD AUTO: 9.09 K/UL — SIGNIFICANT CHANGE UP (ref 3.8–10.5)

## 2018-10-10 PROCEDURE — 99217: CPT

## 2018-10-10 RX ORDER — ONDANSETRON 8 MG/1
4 TABLET, FILM COATED ORAL ONCE
Qty: 0 | Refills: 0 | Status: COMPLETED | OUTPATIENT
Start: 2018-10-10 | End: 2018-10-10

## 2018-10-10 RX ORDER — METOCLOPRAMIDE HCL 10 MG
10 TABLET ORAL ONCE
Qty: 0 | Refills: 0 | Status: COMPLETED | OUTPATIENT
Start: 2018-10-10 | End: 2018-10-10

## 2018-10-10 RX ADMIN — Medication 10 MILLIGRAM(S): at 10:02

## 2018-10-10 RX ADMIN — ONDANSETRON 4 MILLIGRAM(S): 8 TABLET, FILM COATED ORAL at 16:32

## 2018-10-10 RX ADMIN — SODIUM CHLORIDE 125 MILLILITER(S): 9 INJECTION, SOLUTION INTRAVENOUS at 14:47

## 2018-10-10 RX ADMIN — FAMOTIDINE 20 MILLIGRAM(S): 10 INJECTION INTRAVENOUS at 10:02

## 2018-10-10 NOTE — ED CDU PROVIDER DISPOSITION NOTE - CLINICAL COURSE
Pt presented with nausea and vomiting.  tolerating po in the ED. Nausea resolved with parenteral antiemetics.  no vomiting in CDU

## 2018-10-10 NOTE — ED CDU PROVIDER SUBSEQUENT DAY NOTE - HISTORY
32 yo female no PMHX currently at approx. 8 weeks GA confirmed by outpatient US; pt presenting to the ED for nausea and vomiting.  Pt has had 4 prior visits in recent weeks for same symptoms placed on reglan at the time which was added to her zofran that she has been taking for a few weeks now. No relief with both medications.  (Failed diglegys as well) Unable to keep food or liquids down with the zofran and reglan persistently. This am, pt notes mild nausea, would like to attempt, clear liquids.  Will continue IV hydration antiemetics PRN and general monitoring and reassessment.

## 2018-10-10 NOTE — ED CDU PROVIDER DISPOSITION NOTE - PLAN OF CARE
Follow up with your Primary Medical Doctor/ OBGYN within 2-3days. If results or reports were given to you, show copies of your reports given to you. Take all of your medications as previously prescribed. IF any pain, unable to tolerate food, or any other new or concerning symptoms.

## 2018-10-10 NOTE — ED CDU PROVIDER SUBSEQUENT DAY NOTE - PROGRESS NOTE DETAILS
Pt objectively noted to be resting comfortably in the interim; no issues or c/o thus far.  Pt has not had any episodes of vomiting in the interim.  Will continue to monitor.  Pt. stable at present.  Pt. will be signed out to day CDU CHASE Colindres and attending Dr. Shields at 0700 hrs. Acerra:  pt tolerating po throughout the day.  no vomiting here in the ED.  has mild nausea which responded to antiemetic treatment in the ED.  stable for DC

## 2018-10-11 LAB
BACTERIA UR CULT: SIGNIFICANT CHANGE UP
SPECIMEN SOURCE: SIGNIFICANT CHANGE UP

## 2018-10-11 RX ORDER — METOCLOPRAMIDE HCL 10 MG
1 TABLET ORAL
Qty: 21 | Refills: 0
Start: 2018-10-11 | End: 2018-10-17

## 2018-12-06 NOTE — ED ADULT TRIAGE NOTE - SPO2 (%)
Nursing Note by Hemalatha Puri CMA at 10/16/17 09:28 AM     Author:  Hemalatha Puri CMA Service:  (none) Author Type:  Certified Medical Assistant     Filed:  10/16/17 09:29 AM Encounter Date:  10/16/2017 Status:  Signed     :  Hemalatha Puri CMA (Certified Medical Assistant)            Pt to room 9:28 AM  Verified name and   Patient PCP is[TB1.1T]  [facility][TB1.1M]   Chief Complaint     Patient presents with     • Dysuria      urgency, burning, frequency x 3 wk        Pt is currently taking OTC meds that are not listed on the med list:[TB1.1T]  None[TB1.1M]   Manjit Weathers was offered treatment for pain control:[TB1.1T] Yes.  Patient received positioning as a comfort measure to help reduce[TB1.1M] his[TB1.1T] pain.[TB1.1M]    Last visit with ZHAO AGUIRRE was on No match found  [facility] was on 2017 at 12:50 PM [facility]  Match done based on reference date of today 10/16/17[TB1.1T]            Revision History        User Key Date/Time User Provider Type Action    > TB1.1 10/16/17 09:29 AM Hemalatha Puri CMA Certified Medical Assistant Sign    M - Manual, T - Template             100

## 2019-05-09 ENCOUNTER — OUTPATIENT (OUTPATIENT)
Dept: OUTPATIENT SERVICES | Facility: HOSPITAL | Age: 34
LOS: 1 days | End: 2019-05-09
Payer: COMMERCIAL

## 2019-05-09 DIAGNOSIS — O26.899 OTHER SPECIFIED PREGNANCY RELATED CONDITIONS, UNSPECIFIED TRIMESTER: ICD-10-CM

## 2019-05-09 DIAGNOSIS — Z3A.00 WEEKS OF GESTATION OF PREGNANCY NOT SPECIFIED: ICD-10-CM

## 2019-05-09 LAB — AMNISURE ROM (RUPTURE OF MEMBRANES): NEGATIVE — SIGNIFICANT CHANGE UP

## 2019-05-09 PROCEDURE — 76815 OB US LIMITED FETUS(S): CPT

## 2019-05-09 PROCEDURE — 76818 FETAL BIOPHYS PROFILE W/NST: CPT | Mod: 26

## 2019-05-09 PROCEDURE — 76818 FETAL BIOPHYS PROFILE W/NST: CPT

## 2019-05-09 PROCEDURE — 59025 FETAL NON-STRESS TEST: CPT

## 2019-05-09 PROCEDURE — G0463: CPT

## 2019-05-09 PROCEDURE — 76815 OB US LIMITED FETUS(S): CPT | Mod: 26

## 2019-05-09 RX ORDER — DOCUSATE SODIUM 100 MG
1 CAPSULE ORAL
Qty: 60 | Refills: 0
Start: 2019-05-09 | End: 2019-06-07

## 2019-05-11 ENCOUNTER — OUTPATIENT (OUTPATIENT)
Dept: OUTPATIENT SERVICES | Facility: HOSPITAL | Age: 34
LOS: 1 days | End: 2019-05-11
Payer: COMMERCIAL

## 2019-05-11 DIAGNOSIS — Z3A.00 WEEKS OF GESTATION OF PREGNANCY NOT SPECIFIED: ICD-10-CM

## 2019-05-11 DIAGNOSIS — O26.899 OTHER SPECIFIED PREGNANCY RELATED CONDITIONS, UNSPECIFIED TRIMESTER: ICD-10-CM

## 2019-05-11 PROCEDURE — 59025 FETAL NON-STRESS TEST: CPT

## 2019-05-11 PROCEDURE — G0463: CPT

## 2019-05-15 ENCOUNTER — OUTPATIENT (OUTPATIENT)
Dept: OUTPATIENT SERVICES | Facility: HOSPITAL | Age: 34
LOS: 1 days | End: 2019-05-15
Payer: COMMERCIAL

## 2019-05-15 DIAGNOSIS — O26.899 OTHER SPECIFIED PREGNANCY RELATED CONDITIONS, UNSPECIFIED TRIMESTER: ICD-10-CM

## 2019-05-15 DIAGNOSIS — Z3A.00 WEEKS OF GESTATION OF PREGNANCY NOT SPECIFIED: ICD-10-CM

## 2019-05-15 PROCEDURE — G0463: CPT

## 2019-05-15 PROCEDURE — 59025 FETAL NON-STRESS TEST: CPT

## 2019-05-16 ENCOUNTER — INPATIENT (INPATIENT)
Facility: HOSPITAL | Age: 34
LOS: 1 days | Discharge: HOME | End: 2019-05-18
Attending: OBSTETRICS & GYNECOLOGY | Admitting: OBSTETRICS & GYNECOLOGY
Payer: COMMERCIAL

## 2019-05-16 VITALS — WEIGHT: 167.55 LBS | HEIGHT: 65 IN

## 2019-05-16 DIAGNOSIS — Z34.80 ENCOUNTER FOR SUPERVISION OF OTHER NORMAL PREGNANCY, UNSPECIFIED TRIMESTER: ICD-10-CM

## 2019-05-16 DIAGNOSIS — O26.899 OTHER SPECIFIED PREGNANCY RELATED CONDITIONS, UNSPECIFIED TRIMESTER: ICD-10-CM

## 2019-05-16 DIAGNOSIS — Z3A.00 WEEKS OF GESTATION OF PREGNANCY NOT SPECIFIED: ICD-10-CM

## 2019-05-16 LAB
APTT BLD: 25.8 SEC — LOW (ref 27.5–36.3)
BASOPHILS # BLD AUTO: 0.02 K/UL — SIGNIFICANT CHANGE UP (ref 0–0.2)
BASOPHILS NFR BLD AUTO: 0.1 % — SIGNIFICANT CHANGE UP (ref 0–2)
EOSINOPHIL # BLD AUTO: 0.04 K/UL — SIGNIFICANT CHANGE UP (ref 0–0.5)
EOSINOPHIL NFR BLD AUTO: 0.2 % — SIGNIFICANT CHANGE UP (ref 0–6)
FIBRINOGEN PPP-MCNC: 543 MG/DL — HIGH (ref 350–510)
HCT VFR BLD CALC: 35.7 % — SIGNIFICANT CHANGE UP (ref 34.5–45)
HGB BLD-MCNC: 11.5 G/DL — SIGNIFICANT CHANGE UP (ref 11.5–15.5)
IMM GRANULOCYTES NFR BLD AUTO: 0.8 % — SIGNIFICANT CHANGE UP (ref 0–1.5)
INR BLD: 0.93 RATIO — SIGNIFICANT CHANGE UP (ref 0.88–1.16)
LYMPHOCYTES # BLD AUTO: 1.58 K/UL — SIGNIFICANT CHANGE UP (ref 1–3.3)
LYMPHOCYTES # BLD AUTO: 9.8 % — LOW (ref 13–44)
MCHC RBC-ENTMCNC: 27.4 PG — SIGNIFICANT CHANGE UP (ref 27–34)
MCHC RBC-ENTMCNC: 32.2 GM/DL — SIGNIFICANT CHANGE UP (ref 32–36)
MCV RBC AUTO: 85 FL — SIGNIFICANT CHANGE UP (ref 80–100)
MONOCYTES # BLD AUTO: 0.74 K/UL — SIGNIFICANT CHANGE UP (ref 0–0.9)
MONOCYTES NFR BLD AUTO: 4.6 % — SIGNIFICANT CHANGE UP (ref 2–14)
NEUTROPHILS # BLD AUTO: 13.63 K/UL — HIGH (ref 1.8–7.4)
NEUTROPHILS NFR BLD AUTO: 84.5 % — HIGH (ref 43–77)
NRBC # BLD: 0 /100 WBCS — SIGNIFICANT CHANGE UP (ref 0–0)
PLATELET # BLD AUTO: 318 K/UL — SIGNIFICANT CHANGE UP (ref 150–400)
PROTHROM AB SERPL-ACNC: 10.3 SEC — SIGNIFICANT CHANGE UP (ref 10–12.9)
RBC # BLD: 4.2 M/UL — SIGNIFICANT CHANGE UP (ref 3.8–5.2)
RBC # FLD: 14.4 % — SIGNIFICANT CHANGE UP (ref 10.3–14.5)
T PALLIDUM AB TITR SER: NEGATIVE — SIGNIFICANT CHANGE UP
WBC # BLD: 16.14 K/UL — HIGH (ref 3.8–10.5)
WBC # FLD AUTO: 16.14 K/UL — HIGH (ref 3.8–10.5)

## 2019-05-16 RX ORDER — BENZOCAINE 10 %
1 GEL (GRAM) MUCOUS MEMBRANE EVERY 6 HOURS
Refills: 0 | Status: DISCONTINUED | OUTPATIENT
Start: 2019-05-16 | End: 2019-05-18

## 2019-05-16 RX ORDER — AER TRAVELER 0.5 G/1
1 SOLUTION RECTAL; TOPICAL EVERY 4 HOURS
Refills: 0 | Status: DISCONTINUED | OUTPATIENT
Start: 2019-05-16 | End: 2019-05-18

## 2019-05-16 RX ORDER — IBUPROFEN 200 MG
600 TABLET ORAL EVERY 6 HOURS
Refills: 0 | Status: COMPLETED | OUTPATIENT
Start: 2019-05-16 | End: 2020-04-13

## 2019-05-16 RX ORDER — HYDROCORTISONE 1 %
1 OINTMENT (GRAM) TOPICAL EVERY 6 HOURS
Refills: 0 | Status: DISCONTINUED | OUTPATIENT
Start: 2019-05-16 | End: 2019-05-18

## 2019-05-16 RX ORDER — OXYTOCIN 10 UNIT/ML
2 VIAL (ML) INJECTION
Qty: 30 | Refills: 0 | Status: DISCONTINUED | OUTPATIENT
Start: 2019-05-16 | End: 2019-05-17

## 2019-05-16 RX ORDER — OXYTOCIN 10 UNIT/ML
333.33 VIAL (ML) INJECTION
Qty: 20 | Refills: 0 | Status: DISCONTINUED | OUTPATIENT
Start: 2019-05-16 | End: 2019-05-18

## 2019-05-16 RX ORDER — TETANUS TOXOID, REDUCED DIPHTHERIA TOXOID AND ACELLULAR PERTUSSIS VACCINE, ADSORBED 5; 2.5; 8; 8; 2.5 [IU]/.5ML; [IU]/.5ML; UG/.5ML; UG/.5ML; UG/.5ML
0.5 SUSPENSION INTRAMUSCULAR ONCE
Refills: 0 | Status: DISCONTINUED | OUTPATIENT
Start: 2019-05-16 | End: 2019-05-18

## 2019-05-16 RX ORDER — LANOLIN
1 OINTMENT (GRAM) TOPICAL EVERY 6 HOURS
Refills: 0 | Status: DISCONTINUED | OUTPATIENT
Start: 2019-05-16 | End: 2019-05-18

## 2019-05-16 RX ORDER — CITRIC ACID/SODIUM CITRATE 300-500 MG
15 SOLUTION, ORAL ORAL EVERY 6 HOURS
Refills: 0 | Status: DISCONTINUED | OUTPATIENT
Start: 2019-05-16 | End: 2019-05-16

## 2019-05-16 RX ORDER — SODIUM CHLORIDE 9 MG/ML
3 INJECTION INTRAMUSCULAR; INTRAVENOUS; SUBCUTANEOUS EVERY 8 HOURS
Refills: 0 | Status: DISCONTINUED | OUTPATIENT
Start: 2019-05-16 | End: 2019-05-18

## 2019-05-16 RX ORDER — MAGNESIUM HYDROXIDE 400 MG/1
30 TABLET, CHEWABLE ORAL
Refills: 0 | Status: DISCONTINUED | OUTPATIENT
Start: 2019-05-16 | End: 2019-05-18

## 2019-05-16 RX ORDER — GLYCERIN ADULT
1 SUPPOSITORY, RECTAL RECTAL AT BEDTIME
Refills: 0 | Status: DISCONTINUED | OUTPATIENT
Start: 2019-05-16 | End: 2019-05-17

## 2019-05-16 RX ORDER — SIMETHICONE 80 MG/1
80 TABLET, CHEWABLE ORAL EVERY 4 HOURS
Refills: 0 | Status: DISCONTINUED | OUTPATIENT
Start: 2019-05-16 | End: 2019-05-18

## 2019-05-16 RX ORDER — OXYCODONE HYDROCHLORIDE 5 MG/1
5 TABLET ORAL
Refills: 0 | Status: DISCONTINUED | OUTPATIENT
Start: 2019-05-16 | End: 2019-05-18

## 2019-05-16 RX ORDER — DIBUCAINE 1 %
1 OINTMENT (GRAM) RECTAL EVERY 6 HOURS
Refills: 0 | Status: DISCONTINUED | OUTPATIENT
Start: 2019-05-16 | End: 2019-05-18

## 2019-05-16 RX ORDER — OXYCODONE HYDROCHLORIDE 5 MG/1
5 TABLET ORAL ONCE
Refills: 0 | Status: DISCONTINUED | OUTPATIENT
Start: 2019-05-16 | End: 2019-05-18

## 2019-05-16 RX ORDER — PRAMOXINE HYDROCHLORIDE 150 MG/15G
1 AEROSOL, FOAM RECTAL EVERY 4 HOURS
Refills: 0 | Status: DISCONTINUED | OUTPATIENT
Start: 2019-05-16 | End: 2019-05-18

## 2019-05-16 RX ORDER — SODIUM CHLORIDE 9 MG/ML
1000 INJECTION, SOLUTION INTRAVENOUS
Refills: 0 | Status: DISCONTINUED | OUTPATIENT
Start: 2019-05-16 | End: 2019-05-16

## 2019-05-16 RX ORDER — DOCUSATE SODIUM 100 MG
100 CAPSULE ORAL
Refills: 0 | Status: DISCONTINUED | OUTPATIENT
Start: 2019-05-16 | End: 2019-05-17

## 2019-05-16 RX ORDER — DIPHENHYDRAMINE HCL 50 MG
25 CAPSULE ORAL EVERY 6 HOURS
Refills: 0 | Status: DISCONTINUED | OUTPATIENT
Start: 2019-05-16 | End: 2019-05-18

## 2019-05-16 RX ORDER — IBUPROFEN 200 MG
600 TABLET ORAL EVERY 6 HOURS
Refills: 0 | Status: DISCONTINUED | OUTPATIENT
Start: 2019-05-16 | End: 2019-05-18

## 2019-05-16 RX ADMIN — Medication 600 MILLIGRAM(S): at 22:05

## 2019-05-16 RX ADMIN — OXYCODONE HYDROCHLORIDE 5 MILLIGRAM(S): 5 TABLET ORAL at 19:30

## 2019-05-16 RX ADMIN — Medication 1000 MILLIUNIT(S)/MIN: at 16:02

## 2019-05-16 RX ADMIN — Medication 2 MILLIUNIT(S)/MIN: at 06:15

## 2019-05-16 RX ADMIN — Medication 1 SPRAY(S): at 18:36

## 2019-05-16 RX ADMIN — Medication 100 MILLIGRAM(S): at 22:05

## 2019-05-16 RX ADMIN — OXYCODONE HYDROCHLORIDE 5 MILLIGRAM(S): 5 TABLET ORAL at 18:28

## 2019-05-16 RX ADMIN — SODIUM CHLORIDE 125 MILLILITER(S): 9 INJECTION, SOLUTION INTRAVENOUS at 03:25

## 2019-05-16 RX ADMIN — SODIUM CHLORIDE 3 MILLILITER(S): 9 INJECTION INTRAMUSCULAR; INTRAVENOUS; SUBCUTANEOUS at 21:40

## 2019-05-16 RX ADMIN — Medication 600 MILLIGRAM(S): at 22:40

## 2019-05-16 RX ADMIN — AER TRAVELER 1 APPLICATION(S): 0.5 SOLUTION RECTAL; TOPICAL at 18:36

## 2019-05-16 RX ADMIN — SODIUM CHLORIDE 125 MILLILITER(S): 9 INJECTION, SOLUTION INTRAVENOUS at 06:10

## 2019-05-17 DIAGNOSIS — R33.9 RETENTION OF URINE, UNSPECIFIED: ICD-10-CM

## 2019-05-17 DIAGNOSIS — D50.0 IRON DEFICIENCY ANEMIA SECONDARY TO BLOOD LOSS (CHRONIC): ICD-10-CM

## 2019-05-17 LAB
HCT VFR BLD CALC: 26.2 % — LOW (ref 34.5–45)
HGB BLD-MCNC: 8.6 G/DL — LOW (ref 11.5–15.5)

## 2019-05-17 RX ORDER — DOCUSATE SODIUM 100 MG
100 CAPSULE ORAL
Refills: 0 | Status: DISCONTINUED | OUTPATIENT
Start: 2019-05-17 | End: 2019-05-18

## 2019-05-17 RX ORDER — SENNA PLUS 8.6 MG/1
2 TABLET ORAL AT BEDTIME
Refills: 0 | Status: DISCONTINUED | OUTPATIENT
Start: 2019-05-17 | End: 2019-05-18

## 2019-05-17 RX ORDER — FERROUS SULFATE 325(65) MG
325 TABLET ORAL THREE TIMES A DAY
Refills: 0 | Status: DISCONTINUED | OUTPATIENT
Start: 2019-05-17 | End: 2019-05-18

## 2019-05-17 RX ADMIN — MAGNESIUM HYDROXIDE 30 MILLILITER(S): 400 TABLET, CHEWABLE ORAL at 11:10

## 2019-05-17 RX ADMIN — Medication 325 MILLIGRAM(S): at 17:25

## 2019-05-17 RX ADMIN — SODIUM CHLORIDE 3 MILLILITER(S): 9 INJECTION INTRAMUSCULAR; INTRAVENOUS; SUBCUTANEOUS at 05:20

## 2019-05-17 RX ADMIN — SODIUM CHLORIDE 3 MILLILITER(S): 9 INJECTION INTRAMUSCULAR; INTRAVENOUS; SUBCUTANEOUS at 22:00

## 2019-05-17 RX ADMIN — Medication 100 MILLIGRAM(S): at 17:25

## 2019-05-17 RX ADMIN — Medication 600 MILLIGRAM(S): at 17:55

## 2019-05-17 RX ADMIN — Medication 1 SPRAY(S): at 11:10

## 2019-05-17 RX ADMIN — Medication 325 MILLIGRAM(S): at 11:10

## 2019-05-17 RX ADMIN — SODIUM CHLORIDE 3 MILLILITER(S): 9 INJECTION INTRAMUSCULAR; INTRAVENOUS; SUBCUTANEOUS at 16:06

## 2019-05-17 RX ADMIN — Medication 100 MILLIGRAM(S): at 05:19

## 2019-05-17 RX ADMIN — Medication 325 MILLIGRAM(S): at 22:18

## 2019-05-17 RX ADMIN — Medication 1 TABLET(S): at 11:10

## 2019-05-17 RX ADMIN — Medication 600 MILLIGRAM(S): at 17:25

## 2019-05-17 RX ADMIN — Medication 600 MILLIGRAM(S): at 06:00

## 2019-05-17 RX ADMIN — Medication 600 MILLIGRAM(S): at 05:20

## 2019-05-17 NOTE — DISCHARGE NOTE OB - HOSPITAL COURSE
patient presented in early labor @39 weeks 6 days  s/p   c/b urinary retention  dunn reinserted, resolved  bladder training done

## 2019-05-17 NOTE — PROGRESS NOTE ADULT - SUBJECTIVE AND OBJECTIVE BOX
OB PA Progress Note PPD#1    Patient seen at bedside resting comfortably offers no current complaints. Ambulating. Dunn in place.  Passing flatus and tolerating regular diet.  both breast/bottle feeding . Denies HA, CP, SOB, N/V/D, dizziness, palpitations, worsening abdominal pain, worsening vaginal bleeding, or any other concerns.    Vital Signs Last 24 Hrs  T(C): 36.6 (17 May 2019 06:04), Max: 37.4 (16 May 2019 17:01)  T(F): 97.9 (17 May 2019 06:04), Max: 99.4 (16 May 2019 17:01)  HR: 85 (17 May 2019 07:05) (80 - 100)  BP: 103/60 (17 May 2019 07:05) (95/65 - 123/75)  BP(mean): 78 (16 May 2019 14:40) (78 - 78)  RR: 15 (17 May 2019 06:04) (15 - 18)  SpO2: 99% (17 May 2019 06:04) (98% - 99%)    Physical Exam:     Gen: A&Ox 3, NAD  Breast: Soft, nontender, nonengorged  Abdomen: +BS, Soft, nontender,  ND; Fundus firm below umbilicus  Gyn: mod lochia, repaired, dunn urine is concentrated  Ext: Nontender,  no worsening edema                          8.6    x     )-----------( x        ( 17 May 2019 06:52 )             26.2

## 2019-05-17 NOTE — DISCHARGE NOTE OB - CARE PROVIDER_API CALL
Carmenza Mendenhall)  Obstetrics and Gynecology  [address], [address]  [place] [address]  Phone: [phone]  Fax: [phone]  Follow Up Time:

## 2019-05-17 NOTE — DISCHARGE NOTE OB - CARE PLAN
Principal Discharge DX:	 (normal spontaneous vaginal delivery)  Goal:	pp care, pain mngt, breastfeeding  Assessment and plan of treatment:	no sex, nothing in the vagina for 6 weeks, no strenuous activity, continue prenatal vitamins while breastfeeding, motrin prn for pain, f/u in the office in 5-6 weeks for postpartum visit  Secondary Diagnosis:	Anemia due to blood loss  Assessment and plan of treatment:	iron tid, colace prn  Secondary Diagnosis:	Urinary retention  Goal:	dunn reinserted, resolved

## 2019-05-17 NOTE — DISCHARGE NOTE OB - PLAN OF CARE
pp care, pain mngt, breastfeeding no sex, nothing in the vagina for 6 weeks, no strenuous activity, continue prenatal vitamins while breastfeeding, motrin prn for pain, f/u in the office in 5-6 weeks for postpartum visit iron tid, colace prn dunn reinserted, resolved

## 2019-05-17 NOTE — DISCHARGE NOTE OB - MEDICATION SUMMARY - MEDICATIONS TO STOP TAKING
I will STOP taking the medications listed below when I get home from the hospital:    pyridoxine 25 mg oral tablet  -- 1 tab(s) by mouth every 8 hours    Zofran ODT 4 mg oral tablet, disintegrating  -- 1 tab(s) by mouth every 8 hours, As Needed -for nausea    Reglan 10 mg oral tablet  -- 1 tab(s) by mouth every 8 hours  -- It is very important that you take or use this exactly as directed.  Do not skip doses or discontinue unless directed by your doctor.  May cause drowsiness.  Alcohol may intensify this effect.  Use care when operating dangerous machinery.  Take medication on an empty stomach 1 hour before or 2 to 3 hours after a meal unless otherwise directed by your doctor.    metoclopramide 10 mg oral tablet  -- 1 tab(s) by mouth every 8 hours MDD:3 tablets  -- It is very important that you take or use this exactly as directed.  Do not skip doses or discontinue unless directed by your doctor.  May cause drowsiness.  Alcohol may intensify this effect.  Use care when operating dangerous machinery.  Take medication on an empty stomach 1 hour before or 2 to 3 hours after a meal unless otherwise directed by your doctor.

## 2019-05-17 NOTE — PROGRESS NOTE ADULT - PROBLEM SELECTOR PLAN 1
A/P: 35y/o AMA PPD#1 s/p @ 39 weeks 6 days c/b urinary retention and acute blood loss anemia, afebrile  -patient was strongly advised to increase water intake  -dc at 10am, f/u void  -iron tid, colace bid  -Continue pain management, pp care  -Encourage OOB and ambulation  -Plan for discharge tomorrow  -d/w Dr. Carol Ann aguila attending

## 2019-05-17 NOTE — PROGRESS NOTE ADULT - ASSESSMENT
A/P: 33y/o AMA PPD#1 s/p @ 39 weeks 6 days c/b urinary retention and acute blood loss anemia, afebrile  -patient was strongly advised to increase water intake  -dc at 10am, f/u void  -iron tid, colace bid  -Continue pain management, pp care  -Encourage OOB and ambulation  -Plan for discharge tomorrow  -d/w Dr. Carol Ann aguila attending

## 2019-05-17 NOTE — DISCHARGE NOTE OB - MEDICATION SUMMARY - MEDICATIONS TO TAKE
I will START or STAY ON the medications listed below when I get home from the hospital:    ibuprofen 400 mg oral tablet  -- 1 tab(s) by mouth every 6 hours  -- Indication: For  (normal spontaneous vaginal delivery)    ferrous sulfate 325 mg (65 mg elemental iron) oral tablet  -- 1 tab(s) by mouth 3 times a day  -- Indication: For Anemia due to blood loss    Colace 100 mg oral capsule  -- 1 cap(s) by mouth 2 times a day  -- Indication: For  (normal spontaneous vaginal delivery)

## 2019-05-18 VITALS
DIASTOLIC BLOOD PRESSURE: 67 MMHG | RESPIRATION RATE: 16 BRPM | TEMPERATURE: 98 F | HEART RATE: 79 BPM | SYSTOLIC BLOOD PRESSURE: 100 MMHG | OXYGEN SATURATION: 99 %

## 2019-05-18 PROCEDURE — 85610 PROTHROMBIN TIME: CPT

## 2019-05-18 PROCEDURE — 86901 BLOOD TYPING SEROLOGIC RH(D): CPT

## 2019-05-18 PROCEDURE — 85014 HEMATOCRIT: CPT

## 2019-05-18 PROCEDURE — 86780 TREPONEMA PALLIDUM: CPT

## 2019-05-18 PROCEDURE — 86850 RBC ANTIBODY SCREEN: CPT

## 2019-05-18 PROCEDURE — G0463: CPT

## 2019-05-18 PROCEDURE — 59050 FETAL MONITOR W/REPORT: CPT

## 2019-05-18 PROCEDURE — 36415 COLL VENOUS BLD VENIPUNCTURE: CPT

## 2019-05-18 PROCEDURE — 85730 THROMBOPLASTIN TIME PARTIAL: CPT

## 2019-05-18 PROCEDURE — 59025 FETAL NON-STRESS TEST: CPT

## 2019-05-18 PROCEDURE — 85018 HEMOGLOBIN: CPT

## 2019-05-18 PROCEDURE — 85384 FIBRINOGEN ACTIVITY: CPT

## 2019-05-18 PROCEDURE — 85027 COMPLETE CBC AUTOMATED: CPT

## 2019-05-18 PROCEDURE — 86900 BLOOD TYPING SEROLOGIC ABO: CPT

## 2019-05-18 RX ORDER — DOCUSATE SODIUM 100 MG
1 CAPSULE ORAL
Qty: 60 | Refills: 0
Start: 2019-05-18 | End: 2019-06-16

## 2019-05-18 RX ORDER — DOCUSATE SODIUM 100 MG
1 CAPSULE ORAL
Qty: 0 | Refills: 0 | DISCHARGE

## 2019-05-18 RX ORDER — IBUPROFEN 200 MG
1 TABLET ORAL
Qty: 56 | Refills: 0
Start: 2019-05-18 | End: 2019-05-31

## 2019-05-18 RX ORDER — FERROUS SULFATE 325(65) MG
1 TABLET ORAL
Qty: 0 | Refills: 0 | DISCHARGE

## 2019-05-18 RX ORDER — FERROUS SULFATE 325(65) MG
1 TABLET ORAL
Qty: 90 | Refills: 0
Start: 2019-05-18 | End: 2019-06-16

## 2019-05-18 RX ORDER — IBUPROFEN 200 MG
1 TABLET ORAL
Qty: 0 | Refills: 0 | DISCHARGE

## 2019-05-18 RX ADMIN — SODIUM CHLORIDE 3 MILLILITER(S): 9 INJECTION INTRAMUSCULAR; INTRAVENOUS; SUBCUTANEOUS at 06:08

## 2019-05-18 RX ADMIN — Medication 325 MILLIGRAM(S): at 06:29

## 2019-05-18 RX ADMIN — Medication 600 MILLIGRAM(S): at 10:44

## 2019-05-18 RX ADMIN — Medication 100 MILLIGRAM(S): at 06:28

## 2019-05-18 RX ADMIN — Medication 600 MILLIGRAM(S): at 00:50

## 2019-05-18 RX ADMIN — Medication 600 MILLIGRAM(S): at 00:20

## 2019-05-18 NOTE — PROGRESS NOTE ADULT - SUBJECTIVE AND OBJECTIVE BOX
OBGYN PA NOTE PPD2  Patient seen and evaluated at bedside,  resting comfortably w/o any acute  complaints.  Denies fever, HA, CP, SOB, N/V/D, dizziness, palpitations, worsening abdominal pain, worsening vaginal bleeding, or any other concerns.  Ambulating and voiding without difficulty.  Passing flatus and tolerating regular diet.  Attempting to breastfeed.     Vital Signs Last 24 Hrs  T(C): 36.7 (18 May 2019 06:00), Max: 36.8 (17 May 2019 18:00)  T(F): 98.1 (18 May 2019 06:00), Max: 98.2 (17 May 2019 18:00)  HR: 79 (18 May 2019 06:00) (79 - 92)  BP: 100/67 (18 May 2019 06:00) (100/67 - 105/68)  BP(mean): --  RR: 16 (18 May 2019 06:00) (16 - 16)  SpO2: 99% (18 May 2019 06:00) (99% - 99%)    Physical Exam:     Gen: A&Ox 3, NAD  Chest: CTAB/L  Cardiac: S1+S2+ RRR  Breast: Soft, nontender, nonengorged  Abdomen: Soft, nontender, Fundus firm below umbilicus, +BS  Gyn: Mild lochia, intact/repaired  Extremities: Nontender, DTRS 2+, no worsening edema                          8.6    x     )-----------( x        ( 17 May 2019 06:52 )             26.2       A/P:  34y F Postpartum day two s/p  @ 39.6 weeks, urinary retention resolved, currently in stable condition  -d/c home  -Discharge instructions given. Patient verbalize understanding    d/w Dr Janie aguila attending

## 2019-05-18 NOTE — PROGRESS NOTE ADULT - ASSESSMENT
A/P:  34y F Postpartum day two s/p  @ 39.6 weeks, urinary retention resolved, currently in stable condition  -d/c home  -Discharge instructions given. Patient verbalize understanding

## 2019-05-27 NOTE — ED PROVIDER NOTE - CROS ED EYES ALL NEG
[facility] Emergency Department  [address] [facility], [address]             May 27, 2019    Patient: Ingrid Richardson   YOB: 1996   Date of Visit: 5/27/2019       To Whom It May Concern:    Maisha Clifton was seen and treated in our emergency department on 5/27/2019. She may return to work on Thursday 5/30/2019.       Sincerely,         [facility] Emergency Dept negative...

## 2019-06-09 NOTE — DISCHARGE NOTE OB - PATIENT PORTAL LINK FT
Cardiac Monitor/Defib/ACLS/Rescue Kit/O2/BVM
You can access the [facility] Patient Portal, offered by [facility], by registering with the following website: [email or web address]

## 2019-11-07 NOTE — ED ADULT NURSE NOTE - DISCHARGE TEACHING
by PA

## 2021-02-19 NOTE — PATIENT PROFILE OB - FUNCTIONAL LEVEL PRIOR: DRESSING
[facility] Emergency Department  [address], [place] [address]  [phone]     Clinical Summary     PERSON INFORMATION   Name JATIN PITTMAN Age  29 Years  1988 12:00 AM   Acct# [id number] Sex Female Phone [phone]   Dispo Type Home - (i.e. Home on oxygen, DME)-  Arrival 2017 6:51 AM Checkout 2017 10:06 AM    Address: [address] [place], [place] [id number]      Visit Reason COUGH     ED Physician Note     Patient:   JATIN PITTMAN            MRN: [id number]            FIN: [id number]               Age:   29 years     Sex:  Female     :  [birth year]   Associated Diagnoses:   Asthma exacerbation; Viral URI with cough   Author:   Galilea Baltazar      Basic Information   History source: Patient.   Arrival mode: Walking.      History of Present Illness   The patient presents with cough.  The onset was 1  weeks ago.  The course/duration of symptoms is constant.  Character dry.  The degree at onset was minimal.  The degree at present is minimal.  The exacerbating factor is allergen exposure.  The relieving factor is beta agonist.  Risk factors consist of asthma.  Prior episodes: occasional.  Therapy today: beta-agonist (albuterol, 9pm yesterday).  Associated symptoms: rhinorrhea, denies shortness of breath, denies chest pain, denies sore throat, denies nasal congestion, denies hoarse voice, denies fever and denies chills.        Review of Systems   Constitutional symptoms:  No fever,    Skin symptoms:  No rash,    Eye symptoms:  No recent vision problems,    ENMT symptoms:  No sore throat,    Respiratory symptoms:  Cough, wheezing, No shortness of breath,    Cardiovascular symptoms:  No chest pain,    Gastrointestinal symptoms:  No abdominal pain,    Genitourinary symptoms:  No dysuria,    Musculoskeletal symptoms:  No Joint pain,    Neurologic symptoms:  No headache,    Psychiatric symptoms:  Negative except as documented in HPI.   Endocrine  symptoms:  Negative except as documented in HPI.   Hematologic/Lymphatic symptoms:  Negative except as documented in HPI.   Allergy/immunologic symptoms:  Negative except as documented in HPI.      Health Status   Allergies:    Allergic Reactions (Selected)  Severity Not Documented  Adhesive Bandage- Rash.  Amoxicillin- Rash.  Doxycycline- \"paper skin\".  Flagyl- High bp.  Keflex- \"paper skin\".  Latex- Swelling.  Phenergan with Codeine- Hallucinations and lethargy..   Immunizations: Up to date.      Past Medical/ Family/ Social History      Medical history   Respiratory: asthma.   Pernicious anemia and celiac disease.   Surgical history: Hysterectomy.   Social history: Alcohol use: Denies, Tobacco use: Denies, Drug use: Denies.      Physical Examination               Vital Signs   Vital Signs   07/31/17 09:30           Peripheral Pulse Rate     112 bpm  HI                             Respiratory Rate          18 br/min                             Systolic Blood Pressure   136 mmHg                             Diastolic Blood Pressure  91 mmHg  HI                             Mean Arterial Pressure    106 mmHg                             Oxygen Saturation         98 %    07/31/17 08:30           Peripheral Pulse Rate     108 bpm  HI                             Respiratory Rate          22 br/min  HI                             Systolic Blood Pressure   130 mmHg                             Diastolic Blood Pressure  80 mmHg                             Mean Arterial Pressure    97 mmHg                             Oxygen Saturation         97 %    07/31/17 07:00           Temperature Oral          98.3 F                             Peripheral Pulse Rate     83 bpm                             Respiratory Rate          20 br/min                             Systolic Blood Pressure   132 mmHg                             Diastolic Blood Pressure  79 mmHg                             Mean Arterial Pressure    97 mmHg                              Oxygen Saturation         97 %  .               Per nurse's notes.   Vital Signs were reviewed.   Pulse Ox > 95% on Room Air which is normal for this patient.   General:  Alert, no acute distress.    Skin:  Warm, dry.    Head:  Normocephalic, atraumatic.    Neck:  Supple, trachea midline.    Eye:  Pupils are equal, round and reactive to light, extraocular movements are intact.    Ears, nose, mouth and throat:  Oral mucosa moist.   Cardiovascular:  Regular rate and rhythm, S1, S2.    Respiratory:  Lungs are clear to auscultation, respirations are non-labored, Symmetrical chest wall expansion, Moderate wheezing in all lung fields.    Musculoskeletal:  Normal ROM, no swelling, no deformity.    Neurological:  Alert and oriented to person, place, time, and situation, No focal neurological deficit observed.    Lymphatics:  No lymphadenopathy.   Psychiatric:  Cooperative, appropriate mood & affect.    Medications      Medical Decision Making   Differential Diagnosis::  Bronchitis, upper respiratory infection, asthma, pneumonia, influenza, pharyngitis.    Rationale:  Patient is a 29-year-old female who presents to the ED with a dry cough for the past week.  The patient has a past medical history significant for ALLERGY-induced asthma.  Patient denies any fevers.  Patient states her cough is dry in nature.  Patient states she did use an albuterol treatment last night without improvement in her symptoms.  The patient was nontoxic, afebrile and her vitals are stable.  On physical exam patient had moderate wheezing in all lung fields.  Patient received oral prednisone and 2 DuoNeb back-to-back and was reevaluated.  Upon reevaluation patient stated she was starting to feel better but still had mild wheezing.  O2 sats were still above 95 percent.  The patient received an hour-long nebulizer treatment and was reevaluated.  Upon reevaluation wheezing had resolved, O2 sats remained above 95 percent and patient stated  0 = independent she felt 100 percent better.  At this time I feel the patient most likely is suffering from an asthma exacerbation and she was prescribed prednisone liquid to take at home her request the pills irritate her stomach.  Patient was instructed to follow-up with her primary care doctor in 2-3 days.  Patient was instructed to otherwise return to the emergency room if her symptoms worsen..      Reexamination/ Reevaluation   Patient was given oral prednisolone, 2 DuoNeb back-to-back and was reevaluated, upon reevaluation wheezing had improved but is still present patient stated she felt about 50 percent better  Patient was then given an hour-long nebulizer treatment and reevaluated, upon reevaluation wheezing had completely resolved, O2 sats remained above 95 percent and patient stated she felt better.      Impression and Plan   Diagnosis   Asthma exacerbation (GTV90-BM J45.901, Discharge, Medical)   Viral URI with cough (MLC56-TY J06.9, Discharge, Medical)   Plan   Condition: Stable.    Disposition: Discharged: to home.    Prescriptions: Launch prescriptions   Pharmacy:  predniSONE 5 mg/ml oral solution (Prescribe): 30 mg, PO, qDay, for 5 day, 30 mL, 0 Refill(s).    Patient was given the following educational materials: Asthma, Adult.    Follow up with: Follow up with primary care provider Within 2 to 3 days Call for follow up appointment  Follow-Up As Directed  Return if symptoms worsen  Use albuterol nebulizer solution every 4 hours for the next 48 hours then may increase to every 6 day thereafter.    Counseled: Patient, Regarding diagnosis, Regarding treatment plan, Regarding prescription, Patient indicated understanding of instructions.       ED Time Seen By Provider Entered On:  7/31/2017 6:56     Performed On:  7/31/2017 6:56  by Galilea Baltazar      Time Seen By Provider   Time Seen by Provider :   7/31/2017 06:56    Galilea Baltazar - 7/31/2017 6:56           VITALS INFORMATION  Vitals/Ht/Wt  Peripheral Pulse  Rate:  112 bpm  Respiratory Rate:  18 br/min  Oxygen Saturation:  98 %  Oxygen Therapy:  Room air  Systolic Blood Pressure:  136 mmHg  Diastolic Blood Pressure:  91 mmHg  Mean Arterial Pressure:  106 mmHg  Height:  162 cm  Weight:  107.25 kg  ED Hand-Off Communication  ED Hand-Off Reason:  Shift Report  ED Hand-Off Reason / In Hospital:  SBAR reviewed during report  Patient on Cardiac Monitor:  No  Sitter Present:  No  Potential Core Measure:  N/A  Hand-off Report Given to:  Quin Lara       MEDICAL    Allergy Info:          Latex; Adhesive Bandage; Phenergan with Codeine; Flagyl; Keflex; amoxicillin; doxycycline             Prescriptions:                Prescription Display   predniSONE (predniSONE 5 mg/ml oral solution) 30 mg =, PO, BID, # 30 mL, 0 Refill(s)   predniSONE (predniSONE 5 mg/ml oral solution) 30 mg =, PO, qDay, # 30 mL, 0 Refill(s)          DISCHARGE INFORMATION   Discharge Disposition: Home - (i.e. Home on oxygen, DME)- 01     PATIENT EDUCATION INFORMATION   Instructions:       Asthma, Adult   Follow up:                With: Address: When:   Follow up with primary care provider  Within 2 to 3 days   Comments:   Call for follow up appointment   Follow-Up As Directed   Return if symptoms worsen   Use albuterol nebulizer solution every 4 hours for the next 48 hours then may increase to every 6 day thereafter            DIAGNOSIS  Asthma exacerbation; Viral URI with cough

## 2022-02-09 NOTE — ED ADULT NURSE NOTE - NSFALLRSKPASTHIST_ED_ALL_ED
[facility] ambulatory encounter  ORTHOPEDIC PROCEDURE NOTE - VISCOSUPPLEMENTATION  SECOND INJECTION    CHIEF COMPLAINT:  Office Visit ((2/3) Right knee Synvisc series injection )      SUBJECTIVE:    Jet Trujillo Jr. is a 73 year old male who presents today for his second Synvisc injection, right knee.    Review of systems:    All other systems are reviewed and are negative except as documented   in the History of Present Illness.    OBJECTIVE:    Physical Examination:  Vitals:   Visit Vitals  Resp 16   Ht 6' 4\" (1.93 m)   Wt 117.8 kg (259 lb 11.2 oz)   BMI 31.61 kg/m²      Constitutional:  Well-developed, well-nourished male in no acute distress.  Musculoskeletal: Examination shows that the injection site(s) is/are clear.     Assessment/PLAN:    Osteoarthritis, right knee    PROCEDURE NOTE:  The patient was appropriately positioned.  The injection site(s) was/were prepped with alcohol.  The right knee(s) was injected with 2 mL Synvisc. Band-Aid(s) was/were applied. The patient tolerated the procedure well without complications.   He will return to the clinic in one week for Synvisc injection #3, right knee.    Instructions provided as documented in the after visit summary.    On 2/9/2022, Aby AREVALO scribed the services personally performed by Chance Caicedo MD    The documentation recorded by the scribe accurately and completely reflects the service(s) I personally performed and the decisions made by me.     cc: Oneyda Rodgers MD     no

## 2022-06-13 NOTE — PATIENT PROFILE OB - FUNCTIONAL SCREEN CURRENT LEVEL: SWALLOWING (IF SCORE 2 OR MORE FOR ANY ITEM, CONSULT REHAB SERVICES), MLM)
Subjective:       Patient ID: Sachin Gonzalez is a 58 y.o. male.    Chief Complaint: Follow-up (Toe pain f/u. 7/10 B/L pain at present to right and left digits. Non diabetic, PCP: Dr. Greenberg last seen 04/20/22)      HPI: Sachin Gonzalez presents to the office today, with complaints of pains to the right foot. The pains are stated as moderate to severe to the ball(s) of the foot. He was given an injection at his last appointment on 5/4/22. States the injection was helpful for approximately 3 weeks. Does have a history of lumbar spinal stenosis. States no chronic back pains. States NSAIDs. States feeling mildly improved.    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    Traumatic osteoarthritis of knee or lower leg 8/23/2012       Family History   Problem Relation Age of Onset    Stroke Father     Alcohol abuse Father     Arthritis Mother     Hypertension Mother     Anxiety disorder Mother     No Known Problems Sister     Kidney disease Brother        Social History     Socioeconomic History    Marital status: Single   Tobacco Use    Smoking status: Never Smoker    Smokeless tobacco: Never Used   Substance and Sexual Activity    Alcohol use: No    Drug use: No    Sexual activity: Yes     Partners: Female   Social History Narrative    No pets or smokers in household.       Past Surgical History:   Procedure Laterality Date    COLONOSCOPY N/A 4/21/2021    Procedure: COLONOSCOPY covid test scheduled on 4/19/21;  Surgeon: Darrell Worthington MD;  Location: [facility];  Service: Endoscopy;  Laterality: N/A;    CYSTOURETHROSCOPY WITH DIRECT VISION INTERNAL URETHROTOMY N/A 5/31/2022    Procedure: CYSTOSCOPY, WITH DIRECT VISION INTERNAL URETHROTOMY;  Surgeon: Yaniv Murray MD;   "Location: [facility] OR;  Service: Urology;  Laterality: N/A;    ESOPHAGOGASTRODUODENOSCOPY      ESOPHAGOGASTRODUODENOSCOPY N/A 4/5/2021    Procedure: EGD (ESOPHAGOGASTRODUODENOSCOPY) Rapid Covid test needed;  Surgeon: Darrell Worthington MD;  Location: [facility] ENDO;  Service: Endoscopy;  Laterality: N/A;    PROSTATECTOMY  2011       Review of Systems   Constitutional: Negative for chills, fatigue and fever.   HENT: Negative for hearing loss.    Eyes: Negative for photophobia and visual disturbance.   Respiratory: Negative for cough, chest tightness, shortness of breath and wheezing.    Cardiovascular: Negative for chest pain and palpitations.   Gastrointestinal: Negative for constipation, diarrhea, nausea and vomiting.   Endocrine: Negative for cold intolerance and heat intolerance.   Genitourinary: Negative for flank pain.   Musculoskeletal: Positive for gait problem. Negative for neck pain and neck stiffness.   Neurological: Negative for light-headedness and headaches.   Psychiatric/Behavioral: Negative for sleep disturbance.         Objective:   Ht 6' 7" (2.007 m)   Wt 103.5 kg (228 lb 2.8 oz)   BMI 25.71 kg/m²     LOWER EXTREMITY PHYSICAL EXAMINATION    DERMATOLOGY: Skin is supple, dry and intact.     NEUROLOGY: Upon palpation of the interspaces, there are no neurological sensations stated that radiate proximal or distal. Upon compression of the metatarsal heads from medial to lateral, no neurological sensations or symptoms are stated.    ORTHOPEDIC: Manual Muscle Testing is 5/5 in all planes on the left and right foot, without pains, with and without resistance. There is moderate discomfort to palpation at the right plantar 2nd, 3rd and 4th MTPJ. Plantar fat pad atrophy with displacement is noted.  Gastrocsoleal equinus contracture is noted.  Rectus foot type is noted. Negative Lachman's testing is noted. Concern is not for Predislocation Syndrome or plantar plate rupture at the not MTPJ(s). No concern for " inter-metatarsal bursitis.    Assessment:     1. Bursitis of intermetatarsal bursa of right foot    2. Neuroma, Roque's, right        Plan:     Bursitis of intermetatarsal bursa of right foot  -     dexamethasone injection 4 mg  -     triamcinolone acetonide injection 40 mg    Neuroma, Roque's, right  -     dexamethasone injection 4 mg  -     triamcinolone acetonide injection 40 mg    Thorough discussion is had with the patient today, concerning the diagnosis, its etiology, and the treatment algorithm at present.     Following sterile preparation with alcohol swab and/or betadine paint, injection was given into and around the area of the right 2nd, 3rd and 4th MTPJ, using 25-gauge needle. Injection consisted of approximately 0.5cc of Dexamethasone Sodium Phosphate, 0.5cc of Kenalog-40 and 0.5cc of 1% Lidocaine w/ or w/o Epinephrine or 0.25% or 0.50% Marcaine plain. Bandage application thereafter. Patient tolerated procedure well, and without complications or complaints. Patient educated that the area of pathology, might actually be slightly more painful, due to the injection, over the course of the next one to two days.     Patient's pain are mainly at the tips of the 2nd-4th digits and the 3rd and 4th webspace on the RLE.         Future Appointments   Date Time Provider Department Center   6/23/2022  3:00 PM Janell Bradley MD [facility] HEPAT [facility]   7/18/2022 10:30 AM Yaniv Murray MD [facility] UROLOGY [facility]   7/18/2022 11:00 AM Reina Guzman DPM [facility] POD [facility]   8/2/2022  9:55 AM LABORATORY, [facility] LAB [facility]   8/11/2022  3:30 PM Jenn Jackson MD [facility] RHEUM Pacheco y   10/18/2022  2:40 PM KEYONA Greenberg Jr., MD [facility]          0 = swallows foods/liquids without difficulty

## 2024-02-26 NOTE — ED ADULT NURSE NOTE - NSFALLRSKHARMRISK_ED_ALL_ED
February 26, 2024       Jose Ramon Rojas MD  [address] [place] [address]  Via Fax: [phone]      Patient: Carey Bojorquez   YOB: 1951   Date of Visit: 2/26/2024       Dear Dr. Rojas:    I saw your patient, Carey Bojorquez, for an evaluation. Below are my notes for this visit with her.    If you have questions, please do not hesitate to call me.      Sincerely,        Marissa Ochoa MD        CC: Alyssa Gutiérrez PA-C  2/26/2024 12:43 PM  Signed  Met with patient in the office and reviewed her Survivorship Care Plan (SCP) with her. We discussed her pathology, treatments, follow up plan and importance of overall health maintenance. Care plan shared with Dr. Ochoa, Dr. Rojas, Dr. Cm, Dr. Waggoner on 2/26/24    Please see the SCP for what was discussed.      In addition to receiving the SCP, handouts regarding healthy eating, cancer screening, and aromatase inhibitors were also provided to patient.    All questions answered.      60 minutes were personally spent on this encounter.   This includes chart review, documenting and creating the care plan, and face-to-face time reviewing the care plan with the patient.        Alyssa Low PA-C         
no

## 2024-05-08 ENCOUNTER — APPOINTMENT (OUTPATIENT)
Dept: HUMAN REPRODUCTION | Facility: CLINIC | Age: 39
End: 2024-05-08
Payer: COMMERCIAL

## 2024-05-08 PROCEDURE — 99459 PELVIC EXAMINATION: CPT | Mod: NC

## 2024-05-08 PROCEDURE — 99499PP: CUSTOM

## 2024-05-08 PROCEDURE — 76830 TRANSVAGINAL US NON-OB: CPT | Mod: NC

## 2024-05-08 PROCEDURE — 36415 COLL VENOUS BLD VENIPUNCTURE: CPT | Mod: NC

## 2024-05-08 PROCEDURE — 99205 OFFICE O/P NEW HI 60 MIN: CPT | Mod: NC

## 2024-05-15 ENCOUNTER — APPOINTMENT (OUTPATIENT)
Dept: HUMAN REPRODUCTION | Facility: CLINIC | Age: 39
End: 2024-05-15

## 2024-05-15 ENCOUNTER — APPOINTMENT (OUTPATIENT)
Dept: HUMAN REPRODUCTION | Facility: CLINIC | Age: 39
End: 2024-05-15
Payer: COMMERCIAL

## 2024-05-15 PROCEDURE — 99213 OFFICE O/P EST LOW 20 MIN: CPT | Mod: NC

## 2024-05-15 PROCEDURE — 76857 US EXAM PELVIC LIMITED: CPT | Mod: NC

## 2024-05-15 PROCEDURE — 36415 COLL VENOUS BLD VENIPUNCTURE: CPT | Mod: NC

## 2024-05-17 ENCOUNTER — APPOINTMENT (OUTPATIENT)
Dept: HUMAN REPRODUCTION | Facility: CLINIC | Age: 39
End: 2024-05-17
Payer: COMMERCIAL

## 2024-05-17 PROCEDURE — 58974 EMBRYO TRANSFER INTRAUTERINE: CPT | Mod: 52

## 2024-05-17 PROCEDURE — 76831P: CUSTOM

## 2024-06-05 ENCOUNTER — APPOINTMENT (OUTPATIENT)
Dept: HUMAN REPRODUCTION | Facility: CLINIC | Age: 39
End: 2024-06-05

## 2024-06-06 ENCOUNTER — APPOINTMENT (OUTPATIENT)
Dept: HUMAN REPRODUCTION | Facility: CLINIC | Age: 39
End: 2024-06-06
Payer: COMMERCIAL

## 2024-06-06 PROCEDURE — 99215 OFFICE O/P EST HI 40 MIN: CPT | Mod: NC

## 2024-06-19 ENCOUNTER — APPOINTMENT (OUTPATIENT)
Dept: HUMAN REPRODUCTION | Facility: CLINIC | Age: 39
End: 2024-06-19
Payer: COMMERCIAL

## 2024-06-19 PROCEDURE — 99213 OFFICE O/P EST LOW 20 MIN: CPT | Mod: NC

## 2024-06-19 PROCEDURE — 76857 US EXAM PELVIC LIMITED: CPT | Mod: NC

## 2024-06-19 PROCEDURE — 36415 COLL VENOUS BLD VENIPUNCTURE: CPT | Mod: NC

## 2024-06-27 ENCOUNTER — APPOINTMENT (OUTPATIENT)
Dept: HUMAN REPRODUCTION | Facility: CLINIC | Age: 39
End: 2024-06-27
Payer: COMMERCIAL

## 2024-06-27 PROCEDURE — 36415 COLL VENOUS BLD VENIPUNCTURE: CPT | Mod: NC

## 2024-06-27 PROCEDURE — 76857 US EXAM PELVIC LIMITED: CPT | Mod: NC

## 2024-06-27 PROCEDURE — S4042: CPT | Mod: NC

## 2024-06-27 PROCEDURE — 99213 OFFICE O/P EST LOW 20 MIN: CPT | Mod: NC

## 2024-07-02 ENCOUNTER — APPOINTMENT (OUTPATIENT)
Dept: HUMAN REPRODUCTION | Facility: CLINIC | Age: 39
End: 2024-07-02
Payer: COMMERCIAL

## 2024-07-02 PROCEDURE — 76857 US EXAM PELVIC LIMITED: CPT | Mod: NC

## 2024-07-02 PROCEDURE — 99213 OFFICE O/P EST LOW 20 MIN: CPT | Mod: NC

## 2024-07-02 PROCEDURE — 36415 COLL VENOUS BLD VENIPUNCTURE: CPT | Mod: NC

## 2024-07-05 ENCOUNTER — APPOINTMENT (OUTPATIENT)
Dept: HUMAN REPRODUCTION | Facility: CLINIC | Age: 39
End: 2024-07-05
Payer: COMMERCIAL

## 2024-07-05 PROCEDURE — 99459 PELVIC EXAMINATION: CPT | Mod: NC

## 2024-07-05 PROCEDURE — 36415 COLL VENOUS BLD VENIPUNCTURE: CPT | Mod: NC

## 2024-07-05 PROCEDURE — 99213 OFFICE O/P EST LOW 20 MIN: CPT | Mod: NC

## 2024-07-05 PROCEDURE — 76857 US EXAM PELVIC LIMITED: CPT | Mod: NC

## 2024-07-08 ENCOUNTER — APPOINTMENT (OUTPATIENT)
Dept: HUMAN REPRODUCTION | Facility: CLINIC | Age: 39
End: 2024-07-08
Payer: COMMERCIAL

## 2024-07-08 PROCEDURE — 76857 US EXAM PELVIC LIMITED: CPT | Mod: NC

## 2024-07-08 PROCEDURE — 36415 COLL VENOUS BLD VENIPUNCTURE: CPT | Mod: NC

## 2024-07-08 PROCEDURE — 99213 OFFICE O/P EST LOW 20 MIN: CPT | Mod: NC

## 2024-07-09 ENCOUNTER — APPOINTMENT (OUTPATIENT)
Dept: HUMAN REPRODUCTION | Facility: CLINIC | Age: 39
End: 2024-07-09
Payer: COMMERCIAL

## 2024-07-09 PROCEDURE — 76857 US EXAM PELVIC LIMITED: CPT | Mod: NC

## 2024-07-09 PROCEDURE — 36415 COLL VENOUS BLD VENIPUNCTURE: CPT | Mod: NC

## 2024-07-09 PROCEDURE — 99213 OFFICE O/P EST LOW 20 MIN: CPT | Mod: NC

## 2024-07-10 ENCOUNTER — APPOINTMENT (OUTPATIENT)
Dept: HUMAN REPRODUCTION | Facility: CLINIC | Age: 39
End: 2024-07-10
Payer: COMMERCIAL

## 2024-07-10 PROCEDURE — 36415 COLL VENOUS BLD VENIPUNCTURE: CPT | Mod: NC

## 2024-07-11 ENCOUNTER — APPOINTMENT (OUTPATIENT)
Dept: HUMAN REPRODUCTION | Facility: CLINIC | Age: 39
End: 2024-07-11
Payer: COMMERCIAL

## 2024-07-11 PROCEDURE — 76948 ECHO GUIDE OVA ASPIRATION: CPT | Mod: NC

## 2024-07-11 PROCEDURE — 89290P: CUSTOM

## 2024-07-11 PROCEDURE — 58970 RETRIEVAL OF OOCYTE: CPT | Mod: NC

## 2024-07-11 PROCEDURE — S4016P: CUSTOM

## 2024-07-11 PROCEDURE — 89281P: CUSTOM

## 2024-07-12 ENCOUNTER — APPOINTMENT (OUTPATIENT)
Dept: HUMAN REPRODUCTION | Facility: CLINIC | Age: 39
End: 2024-07-12

## 2024-07-23 ENCOUNTER — APPOINTMENT (OUTPATIENT)
Dept: HUMAN REPRODUCTION | Facility: CLINIC | Age: 39
End: 2024-07-23
Payer: COMMERCIAL

## 2024-07-23 PROCEDURE — S4042: CPT | Mod: NC

## 2024-07-23 PROCEDURE — 36415 COLL VENOUS BLD VENIPUNCTURE: CPT | Mod: NC

## 2024-07-23 PROCEDURE — 76830 TRANSVAGINAL US NON-OB: CPT | Mod: NC

## 2024-07-23 PROCEDURE — 99213 OFFICE O/P EST LOW 20 MIN: CPT | Mod: NC

## 2024-07-26 ENCOUNTER — APPOINTMENT (OUTPATIENT)
Dept: HUMAN REPRODUCTION | Facility: CLINIC | Age: 39
End: 2024-07-26
Payer: COMMERCIAL

## 2024-07-26 PROCEDURE — 99459 PELVIC EXAMINATION: CPT | Mod: NC

## 2024-07-26 PROCEDURE — 99213 OFFICE O/P EST LOW 20 MIN: CPT | Mod: NC

## 2024-07-26 PROCEDURE — 76857 US EXAM PELVIC LIMITED: CPT | Mod: NC

## 2024-07-26 PROCEDURE — 36415 COLL VENOUS BLD VENIPUNCTURE: CPT | Mod: NC

## 2024-07-29 ENCOUNTER — TRANSCRIPTION ENCOUNTER (OUTPATIENT)
Age: 39
End: 2024-07-29

## 2024-07-29 ENCOUNTER — APPOINTMENT (OUTPATIENT)
Dept: HUMAN REPRODUCTION | Facility: CLINIC | Age: 39
End: 2024-07-29
Payer: COMMERCIAL

## 2024-07-29 PROCEDURE — 36415 COLL VENOUS BLD VENIPUNCTURE: CPT | Mod: NC

## 2024-07-29 PROCEDURE — 99459 PELVIC EXAMINATION: CPT | Mod: NC

## 2024-07-29 PROCEDURE — 99213 OFFICE O/P EST LOW 20 MIN: CPT | Mod: NC

## 2024-07-29 PROCEDURE — 76857 US EXAM PELVIC LIMITED: CPT | Mod: NC

## 2024-08-01 ENCOUNTER — APPOINTMENT (OUTPATIENT)
Dept: HUMAN REPRODUCTION | Facility: CLINIC | Age: 39
End: 2024-08-01
Payer: COMMERCIAL

## 2024-08-01 PROCEDURE — 36415 COLL VENOUS BLD VENIPUNCTURE: CPT | Mod: NC

## 2024-08-01 PROCEDURE — 99213 OFFICE O/P EST LOW 20 MIN: CPT | Mod: NC

## 2024-08-01 PROCEDURE — 76857 US EXAM PELVIC LIMITED: CPT | Mod: NC

## 2024-08-01 PROCEDURE — 99459 PELVIC EXAMINATION: CPT | Mod: NC

## 2024-08-03 ENCOUNTER — APPOINTMENT (OUTPATIENT)
Dept: HUMAN REPRODUCTION | Facility: CLINIC | Age: 39
End: 2024-08-03
Payer: COMMERCIAL

## 2024-08-03 PROCEDURE — 76857 US EXAM PELVIC LIMITED: CPT | Mod: NC

## 2024-08-03 PROCEDURE — 99213 OFFICE O/P EST LOW 20 MIN: CPT | Mod: NC

## 2024-08-03 PROCEDURE — 36415 COLL VENOUS BLD VENIPUNCTURE: CPT | Mod: NC

## 2024-08-05 ENCOUNTER — APPOINTMENT (OUTPATIENT)
Dept: HUMAN REPRODUCTION | Facility: CLINIC | Age: 39
End: 2024-08-05

## 2024-08-05 PROCEDURE — 36415 COLL VENOUS BLD VENIPUNCTURE: CPT | Mod: NC

## 2024-08-06 ENCOUNTER — APPOINTMENT (OUTPATIENT)
Dept: HUMAN REPRODUCTION | Facility: CLINIC | Age: 39
End: 2024-08-06

## 2024-08-06 PROCEDURE — 76948 ECHO GUIDE OVA ASPIRATION: CPT | Mod: NC

## 2024-08-06 PROCEDURE — 89281P: CUSTOM

## 2024-08-06 PROCEDURE — S4016P: CUSTOM

## 2024-08-06 PROCEDURE — 58970 RETRIEVAL OF OOCYTE: CPT | Mod: NC

## 2024-08-07 ENCOUNTER — APPOINTMENT (OUTPATIENT)
Dept: HUMAN REPRODUCTION | Facility: CLINIC | Age: 39
End: 2024-08-07

## 2024-08-11 PROCEDURE — 89290P: CUSTOM

## 2024-08-13 PROCEDURE — 89290P: CUSTOM

## 2024-08-19 ENCOUNTER — APPOINTMENT (OUTPATIENT)
Dept: HUMAN REPRODUCTION | Facility: CLINIC | Age: 39
End: 2024-08-19
Payer: COMMERCIAL

## 2024-08-19 PROCEDURE — 99214P: CUSTOM

## 2024-08-19 PROCEDURE — 99215 OFFICE O/P EST HI 40 MIN: CPT | Mod: NC

## 2024-08-22 ENCOUNTER — APPOINTMENT (OUTPATIENT)
Dept: HUMAN REPRODUCTION | Facility: CLINIC | Age: 39
End: 2024-08-22

## 2024-08-26 NOTE — ED PROVIDER NOTE - CPE EDP CARDIAC NORM
Dr Horne called and notified of BP trends and lab results. Order to discharge patient home with blood pressure cuff to monitor at home and clinic will call to schedule NST and BPP. MD schedules IOL at 37 weeks Sunday 9/1/24.     Patient given discharge paperwork and instructed to follow up with PCP if any vaginal bleeding or leaking of fluids, decreased fetal movement, or painful, regular contractions. Pt given preeclampsia warning signs and information on home blood pressure cuff as well. Pt verbalizes understanding.   
Mary arrived ambulatory from the clinic to have extended monitoring/blood pressure check. Mary had elevated blood pressure in the clinic today. Pt denies any headache, vision changes, or r upper quadrant pain. Pt reports positive fetal movement and denies any vaginal bleeding or leaking of fluids. Abdomen soft, nontender. Monitors applied, call light within reach.   
normal...

## 2024-09-18 ENCOUNTER — APPOINTMENT (OUTPATIENT)
Dept: HUMAN REPRODUCTION | Facility: CLINIC | Age: 39
End: 2024-09-18
Payer: COMMERCIAL

## 2024-09-18 PROCEDURE — 76830 TRANSVAGINAL US NON-OB: CPT | Mod: NC

## 2024-09-18 PROCEDURE — 99213 OFFICE O/P EST LOW 20 MIN: CPT | Mod: NC

## 2024-09-18 PROCEDURE — 36415 COLL VENOUS BLD VENIPUNCTURE: CPT | Mod: NC

## 2024-09-25 ENCOUNTER — APPOINTMENT (OUTPATIENT)
Dept: HUMAN REPRODUCTION | Facility: CLINIC | Age: 39
End: 2024-09-25
Payer: COMMERCIAL

## 2024-09-25 PROCEDURE — 99213 OFFICE O/P EST LOW 20 MIN: CPT | Mod: NC

## 2024-09-25 PROCEDURE — 99459 PELVIC EXAMINATION: CPT | Mod: NC

## 2024-09-25 PROCEDURE — 36415 COLL VENOUS BLD VENIPUNCTURE: CPT | Mod: NC

## 2024-09-25 PROCEDURE — 76857 US EXAM PELVIC LIMITED: CPT | Mod: NC

## 2024-09-30 ENCOUNTER — APPOINTMENT (OUTPATIENT)
Dept: HUMAN REPRODUCTION | Facility: CLINIC | Age: 39
End: 2024-09-30
Payer: COMMERCIAL

## 2024-09-30 PROCEDURE — 99213 OFFICE O/P EST LOW 20 MIN: CPT | Mod: NC

## 2024-09-30 PROCEDURE — 36415 COLL VENOUS BLD VENIPUNCTURE: CPT | Mod: NC

## 2024-09-30 PROCEDURE — 99459 PELVIC EXAMINATION: CPT | Mod: NC

## 2024-09-30 PROCEDURE — 76857 US EXAM PELVIC LIMITED: CPT | Mod: NC

## 2024-10-04 ENCOUNTER — APPOINTMENT (OUTPATIENT)
Dept: HUMAN REPRODUCTION | Facility: CLINIC | Age: 39
End: 2024-10-04
Payer: COMMERCIAL

## 2024-10-04 PROCEDURE — 36415 COLL VENOUS BLD VENIPUNCTURE: CPT | Mod: NC

## 2024-10-07 ENCOUNTER — APPOINTMENT (OUTPATIENT)
Dept: HUMAN REPRODUCTION | Facility: CLINIC | Age: 39
End: 2024-10-07
Payer: COMMERCIAL

## 2024-10-07 PROCEDURE — S4037P: CUSTOM

## 2024-10-08 ENCOUNTER — APPOINTMENT (OUTPATIENT)
Dept: HUMAN REPRODUCTION | Facility: CLINIC | Age: 39
End: 2024-10-08
Payer: COMMERCIAL

## 2024-10-08 PROCEDURE — 89255 PREPARE EMBRYO FOR TRANSFER: CPT | Mod: NC

## 2024-10-08 PROCEDURE — 89398P: CUSTOM

## 2024-10-15 PROBLEM — Z00.00 ENCOUNTER FOR PREVENTIVE HEALTH EXAMINATION: Status: ACTIVE | Noted: 2024-10-15

## 2024-10-16 ENCOUNTER — APPOINTMENT (OUTPATIENT)
Dept: HUMAN REPRODUCTION | Facility: CLINIC | Age: 39
End: 2024-10-16
Payer: COMMERCIAL

## 2024-10-16 PROCEDURE — 36415 COLL VENOUS BLD VENIPUNCTURE: CPT | Mod: NC

## 2024-10-18 ENCOUNTER — APPOINTMENT (OUTPATIENT)
Dept: HUMAN REPRODUCTION | Facility: CLINIC | Age: 39
End: 2024-10-18
Payer: COMMERCIAL

## 2024-10-18 PROCEDURE — 36415 COLL VENOUS BLD VENIPUNCTURE: CPT | Mod: NC

## 2024-10-25 ENCOUNTER — APPOINTMENT (OUTPATIENT)
Dept: HUMAN REPRODUCTION | Facility: CLINIC | Age: 39
End: 2024-10-25
Payer: COMMERCIAL

## 2024-10-25 PROCEDURE — 99459 PELVIC EXAMINATION: CPT

## 2024-10-25 PROCEDURE — 76817 TRANSVAGINAL US OBSTETRIC: CPT

## 2024-10-25 PROCEDURE — 99213 OFFICE O/P EST LOW 20 MIN: CPT | Mod: 25

## 2024-10-25 PROCEDURE — 36415 COLL VENOUS BLD VENIPUNCTURE: CPT

## 2024-11-04 ENCOUNTER — APPOINTMENT (OUTPATIENT)
Dept: HUMAN REPRODUCTION | Facility: CLINIC | Age: 39
End: 2024-11-04
Payer: COMMERCIAL

## 2024-11-04 PROCEDURE — 99215 OFFICE O/P EST HI 40 MIN: CPT | Mod: 25

## 2024-11-04 PROCEDURE — 76817 TRANSVAGINAL US OBSTETRIC: CPT

## 2024-11-04 PROCEDURE — 99459 PELVIC EXAMINATION: CPT

## 2024-11-13 ENCOUNTER — APPOINTMENT (OUTPATIENT)
Dept: HUMAN REPRODUCTION | Facility: CLINIC | Age: 39
End: 2024-11-13
Payer: COMMERCIAL

## 2024-11-13 PROCEDURE — 36415 COLL VENOUS BLD VENIPUNCTURE: CPT

## 2024-11-13 PROCEDURE — 99459 PELVIC EXAMINATION: CPT

## 2024-11-13 PROCEDURE — 99213 OFFICE O/P EST LOW 20 MIN: CPT | Mod: 25

## 2024-11-13 PROCEDURE — 76817 TRANSVAGINAL US OBSTETRIC: CPT
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
## 2024-12-22 ENCOUNTER — EMERGENCY (EMERGENCY)
Facility: HOSPITAL | Age: 39
LOS: 1 days | Discharge: HOME | End: 2024-12-22
Attending: EMERGENCY MEDICINE
Payer: COMMERCIAL

## 2024-12-22 VITALS
RESPIRATION RATE: 18 BRPM | TEMPERATURE: 98 F | HEART RATE: 91 BPM | SYSTOLIC BLOOD PRESSURE: 129 MMHG | HEIGHT: 64 IN | OXYGEN SATURATION: 99 % | DIASTOLIC BLOOD PRESSURE: 89 MMHG | WEIGHT: 158.95 LBS

## 2024-12-22 VITALS
SYSTOLIC BLOOD PRESSURE: 115 MMHG | HEART RATE: 79 BPM | OXYGEN SATURATION: 100 % | RESPIRATION RATE: 16 BRPM | TEMPERATURE: 98 F | DIASTOLIC BLOOD PRESSURE: 91 MMHG

## 2024-12-22 LAB
ALBUMIN SERPL ELPH-MCNC: 3.5 G/DL — SIGNIFICANT CHANGE UP (ref 3.3–5)
ALP SERPL-CCNC: 51 U/L — SIGNIFICANT CHANGE UP (ref 40–120)
ALT FLD-CCNC: 24 U/L — SIGNIFICANT CHANGE UP (ref 10–45)
ANION GAP SERPL CALC-SCNC: 13 MMOL/L — SIGNIFICANT CHANGE UP (ref 5–17)
APTT BLD: 28.3 SEC — SIGNIFICANT CHANGE UP (ref 24.5–35.6)
AST SERPL-CCNC: 18 U/L — SIGNIFICANT CHANGE UP (ref 10–40)
BASOPHILS # BLD AUTO: 0.02 K/UL — SIGNIFICANT CHANGE UP (ref 0–0.2)
BASOPHILS NFR BLD AUTO: 0.1 % — SIGNIFICANT CHANGE UP (ref 0–2)
BILIRUB SERPL-MCNC: 0.2 MG/DL — SIGNIFICANT CHANGE UP (ref 0.2–1.2)
BLD GP AB SCN SERPL QL: NEGATIVE — SIGNIFICANT CHANGE UP
BUN SERPL-MCNC: 8 MG/DL — SIGNIFICANT CHANGE UP (ref 7–23)
CALCIUM SERPL-MCNC: 9 MG/DL — SIGNIFICANT CHANGE UP (ref 8.4–10.5)
CHLORIDE SERPL-SCNC: 100 MMOL/L — SIGNIFICANT CHANGE UP (ref 96–108)
CO2 SERPL-SCNC: 17 MMOL/L — LOW (ref 22–31)
CREAT SERPL-MCNC: 0.49 MG/DL — LOW (ref 0.5–1.3)
EGFR: 122 ML/MIN/1.73M2 — SIGNIFICANT CHANGE UP
EOSINOPHIL # BLD AUTO: 0.09 K/UL — SIGNIFICANT CHANGE UP (ref 0–0.5)
EOSINOPHIL NFR BLD AUTO: 0.6 % — SIGNIFICANT CHANGE UP (ref 0–6)
GLUCOSE SERPL-MCNC: 97 MG/DL — SIGNIFICANT CHANGE UP (ref 70–99)
HCG SERPL-ACNC: HIGH MIU/ML
HCT VFR BLD CALC: 34 % — LOW (ref 34.5–45)
HGB BLD-MCNC: 11.8 G/DL — SIGNIFICANT CHANGE UP (ref 11.5–15.5)
IMM GRANULOCYTES NFR BLD AUTO: 0.5 % — SIGNIFICANT CHANGE UP (ref 0–0.9)
INR BLD: 0.95 RATIO — SIGNIFICANT CHANGE UP (ref 0.85–1.16)
LYMPHOCYTES # BLD AUTO: 1.97 K/UL — SIGNIFICANT CHANGE UP (ref 1–3.3)
LYMPHOCYTES # BLD AUTO: 13.4 % — SIGNIFICANT CHANGE UP (ref 13–44)
MCHC RBC-ENTMCNC: 29.3 PG — SIGNIFICANT CHANGE UP (ref 27–34)
MCHC RBC-ENTMCNC: 34.7 G/DL — SIGNIFICANT CHANGE UP (ref 32–36)
MCV RBC AUTO: 84.4 FL — SIGNIFICANT CHANGE UP (ref 80–100)
MONOCYTES # BLD AUTO: 0.66 K/UL — SIGNIFICANT CHANGE UP (ref 0–0.9)
MONOCYTES NFR BLD AUTO: 4.5 % — SIGNIFICANT CHANGE UP (ref 2–14)
NEUTROPHILS # BLD AUTO: 11.93 K/UL — HIGH (ref 1.8–7.4)
NEUTROPHILS NFR BLD AUTO: 80.9 % — HIGH (ref 43–77)
NRBC # BLD: 0 /100 WBCS — SIGNIFICANT CHANGE UP (ref 0–0)
PLATELET # BLD AUTO: 345 K/UL — SIGNIFICANT CHANGE UP (ref 150–400)
POTASSIUM SERPL-MCNC: 4 MMOL/L — SIGNIFICANT CHANGE UP (ref 3.5–5.3)
POTASSIUM SERPL-SCNC: 4 MMOL/L — SIGNIFICANT CHANGE UP (ref 3.5–5.3)
PROT SERPL-MCNC: 7 G/DL — SIGNIFICANT CHANGE UP (ref 6–8.3)
PROTHROM AB SERPL-ACNC: 10.9 SEC — SIGNIFICANT CHANGE UP (ref 9.9–13.4)
RBC # BLD: 4.03 M/UL — SIGNIFICANT CHANGE UP (ref 3.8–5.2)
RBC # FLD: 12.2 % — SIGNIFICANT CHANGE UP (ref 10.3–14.5)
RH IG SCN BLD-IMP: POSITIVE — SIGNIFICANT CHANGE UP
SODIUM SERPL-SCNC: 130 MMOL/L — LOW (ref 135–145)
WBC # BLD: 14.74 K/UL — HIGH (ref 3.8–10.5)
WBC # FLD AUTO: 14.74 K/UL — HIGH (ref 3.8–10.5)

## 2024-12-22 PROCEDURE — 85610 PROTHROMBIN TIME: CPT

## 2024-12-22 PROCEDURE — 99284 EMERGENCY DEPT VISIT MOD MDM: CPT | Mod: 25

## 2024-12-22 PROCEDURE — 85730 THROMBOPLASTIN TIME PARTIAL: CPT

## 2024-12-22 PROCEDURE — 76817 TRANSVAGINAL US OBSTETRIC: CPT | Mod: 26

## 2024-12-22 PROCEDURE — 85025 COMPLETE CBC W/AUTO DIFF WBC: CPT

## 2024-12-22 PROCEDURE — 86850 RBC ANTIBODY SCREEN: CPT

## 2024-12-22 PROCEDURE — 86901 BLOOD TYPING SEROLOGIC RH(D): CPT

## 2024-12-22 PROCEDURE — 76817 TRANSVAGINAL US OBSTETRIC: CPT

## 2024-12-22 PROCEDURE — 99284 EMERGENCY DEPT VISIT MOD MDM: CPT

## 2024-12-22 PROCEDURE — 84702 CHORIONIC GONADOTROPIN TEST: CPT

## 2024-12-22 PROCEDURE — 86900 BLOOD TYPING SEROLOGIC ABO: CPT

## 2024-12-22 PROCEDURE — 80053 COMPREHEN METABOLIC PANEL: CPT

## 2024-12-22 PROCEDURE — 36000 PLACE NEEDLE IN VEIN: CPT

## 2024-12-22 NOTE — ED PROVIDER NOTE - CLINICAL SUMMARY MEDICAL DECISION MAKING FREE TEXT BOX
40 yo F  currently 13 weeks pregnant via IVF presents for vaginal bleeding that began tonight. Known IUP. On exam vitally stable with soft and NT abdomen. Will eval for anemia and FH to eval for SAB vs threatened . Likely DC with OB f/u. Shayna Garvin DO (PGY-2)

## 2024-12-22 NOTE — ED ADULT NURSE NOTE - OBJECTIVE STATEMENT
pt is 39y F, no pmhx, c/o vaginal bleeding, spotting, starting this evening, no pad saturation, no other symptoms, pt 14 weeks pregnant through IVF, LMP Oct, , pt A&Ox4, ambulatory, updated on plan of care

## 2024-12-22 NOTE — ED ADULT NURSE NOTE - CAS EDN DISCHARGE INTERVENTIONS
Relevant Problems   No relevant active problems       Physical Exam    Airway   Mallampati: II  TM distance: >3 FB  Neck ROM: full       Cardiovascular - normal exam  Rhythm: regular  Rate: normal  (-) murmur     Dental - normal exam           Pulmonary - normal exam  Breath sounds clear to auscultation     Abdominal    Neurological - normal exam                 Anesthesia Plan    ASA 2       Plan - general       Airway plan will be ETT          Induction: intravenous    Postoperative Plan: Postoperative administration of opioids is intended.    Pertinent diagnostic labs and testing reviewed    Informed Consent:    Anesthetic plan and risks discussed with patient.    Use of blood products discussed with: patient whom consented to blood products.          IV discontinued, cath removed intact

## 2024-12-22 NOTE — ED PROVIDER NOTE - NSFOLLOWUPINSTRUCTIONS_ED_ALL_ED_FT
Vaginal Bleeding During Pregnancy, First Trimester     A small amount of bleeding from the vagina (spotting) is relatively common during early pregnancy. It usually stops on its own. Various things may cause bleeding or spotting during early pregnancy. Some bleeding may be related to the pregnancy, and some may not. In many cases, the bleeding is normal and is not a problem. However, bleeding can also be a sign of something serious. Be sure to tell your health care provider about any vaginal bleeding right away.  Some possible causes of vaginal bleeding during the first trimester include:  •Infection or inflammation of the cervix.  •Growths (polyps) on the cervix.  •Miscarriage or threatened miscarriage.  •Pregnancy tissue developing outside of the uterus (ectopic pregnancy).  •A mass of tissue developing in the uterus due to an egg being fertilized incorrectly (molar pregnancy).    Follow these instructions at home:  Activity   •Follow instructions from your health care provider about limiting your activity. Ask what activities are safe for you.  •If needed, make plans for someone to help with your regular activities.  • Do not have sex or orgasms until your health care provider says that this is safe.    General instructions   •Take over-the-counter and prescription medicines only as told by your health care provider.  •Pay attention to any changes in your symptoms.  • Do not use tampons or douche.  •Write down how many pads you use each day, how often you change pads, and how soaked (saturated) they are.  •If you pass any tissue from your vagina, save the tissue so you can show it to your health care provider.  •Keep all follow-up visits as told by your health care provider. This is important.    Contact a health care provider if:  •You have vaginal bleeding during any part of your pregnancy.  •You have cramps or labor pains.  •You have a fever.    Get help right away if:  •You have severe cramps in your back or abdomen.  •You pass large clots or a large amount of tissue from your vagina.  •Your bleeding increases.  •You feel light-headed or weak, or you faint.  •You have chills.  •You are leaking fluid or have a gush of fluid from your vagina.    Summary  •A small amount of bleeding (spotting) from the vagina is relatively common during early pregnancy.  •Various things may cause bleeding or spotting in early pregnancy.  •Be sure to tell your health care provider about any vaginal bleeding right away.

## 2024-12-22 NOTE — ED ADULT NURSE NOTE - NS ED PATIENT SAFETY CONCERN
Radiology Post-Procedure Note    Pre Op Diagnosis: HCC  Post Op Diagnosis: Same    Procedure: MWA    Procedure performed by: Wilber Yuen MD    Written Informed Consent Obtained: Yes  Specimen Removed: NO  Estimated Blood Loss: Minimal    Findings:   US and CT guidance used to place a Neuwave PRXT probe in the seg V tumor.  MWA performed at 65W for 6 min (temporarily stopped due to bradycardia), followed by 65W for 7 min.  Tract cauterization performed.  No complications.    Patient tolerated procedure well.    Wilber Yuen MD  Diagnostic and Interventional Radiologist  Department of Radiology  Pager: [phone]    No

## 2024-12-22 NOTE — ED PROVIDER NOTE - PATIENT PORTAL LINK FT
You can access the FollowMyHealth Patient Portal offered by [facility] by registering at the following website: [email or web address]. By joining [facility]’s FollowMyHealth portal, you will also be able to view your health information using other applications (apps) compatible with our system.

## 2024-12-22 NOTE — ED PROVIDER NOTE - PHYSICAL EXAMINATION
Shayna Garvin DO (PGY2)   Physical Exam:    Gen: NAD, AOx3  Lung: CTAB, no respiratory distress, no wheezes/rhonchi/rales B/L  CV: RRR, no murmurs, rubs or gallops  Abd: soft, NT, ND

## 2024-12-22 NOTE — ED PROVIDER NOTE - OBJECTIVE STATEMENT
40 yo F  currently 13 weeks pregnat via IVF presents for vaginal bleeding that began tonight. Patient states she had spotting in her underwear and some spotting when she wiped but no clots. Denies fever, chills, chest pain, shortness of breath, abd pain, nausea, vomiting, hematuria, dysuria, or any other symptoms at this time. Shayna Garvin, DO (PGY-2) 38 yo F  currently 13 weeks pregnat via IVF presents for vaginal bleeding that began tonight. Patient states she had spotting in her underwear and some spotting when she wiped but no clots. Denies fever, chills, chest pain, shortness of breath, abd pain, nausea, vomiting, hematuria, dysuria, or any other symptoms at this time. Shayna Garvin DO (PGY-2)    Attendin-year-old female who is 13 weeks pregnant via IVF presents with vaginal spotting that started tonight.  She has some very mild cramping in the left lower quadrant.  There is no fever or chills.  No nausea or vomiting

## 2025-01-01 ENCOUNTER — EMERGENCY (EMERGENCY)
Facility: HOSPITAL | Age: 40
LOS: 1 days | Discharge: HOME | End: 2025-01-01
Attending: EMERGENCY MEDICINE
Payer: COMMERCIAL

## 2025-01-01 VITALS
HEIGHT: 64 IN | TEMPERATURE: 98 F | RESPIRATION RATE: 16 BRPM | DIASTOLIC BLOOD PRESSURE: 89 MMHG | OXYGEN SATURATION: 97 % | HEART RATE: 84 BPM | SYSTOLIC BLOOD PRESSURE: 116 MMHG | WEIGHT: 156.97 LBS

## 2025-01-01 LAB
ALBUMIN SERPL ELPH-MCNC: 3.8 G/DL — SIGNIFICANT CHANGE UP (ref 3.3–5)
ALP SERPL-CCNC: 50 U/L — SIGNIFICANT CHANGE UP (ref 40–120)
ALT FLD-CCNC: 18 U/L — SIGNIFICANT CHANGE UP (ref 10–45)
ANION GAP SERPL CALC-SCNC: 14 MMOL/L — SIGNIFICANT CHANGE UP (ref 5–17)
APPEARANCE UR: CLEAR — SIGNIFICANT CHANGE UP
AST SERPL-CCNC: 15 U/L — SIGNIFICANT CHANGE UP (ref 10–40)
BACTERIA # UR AUTO: ABNORMAL /HPF
BASOPHILS # BLD AUTO: 0.01 K/UL — SIGNIFICANT CHANGE UP (ref 0–0.2)
BASOPHILS NFR BLD AUTO: 0.1 % — SIGNIFICANT CHANGE UP (ref 0–2)
BILIRUB SERPL-MCNC: 0.3 MG/DL — SIGNIFICANT CHANGE UP (ref 0.2–1.2)
BILIRUB UR-MCNC: NEGATIVE — SIGNIFICANT CHANGE UP
BUN SERPL-MCNC: 9 MG/DL — SIGNIFICANT CHANGE UP (ref 7–23)
CALCIUM SERPL-MCNC: 9.6 MG/DL — SIGNIFICANT CHANGE UP (ref 8.4–10.5)
CAST: 4 /LPF — SIGNIFICANT CHANGE UP (ref 0–4)
CHLORIDE SERPL-SCNC: 101 MMOL/L — SIGNIFICANT CHANGE UP (ref 96–108)
CO2 SERPL-SCNC: 20 MMOL/L — LOW (ref 22–31)
COLOR SPEC: YELLOW — SIGNIFICANT CHANGE UP
CREAT SERPL-MCNC: 0.56 MG/DL — SIGNIFICANT CHANGE UP (ref 0.5–1.3)
DIFF PNL FLD: NEGATIVE — SIGNIFICANT CHANGE UP
EGFR: 118 ML/MIN/1.73M2 — SIGNIFICANT CHANGE UP
EGFR: 118 ML/MIN/1.73M2 — SIGNIFICANT CHANGE UP
EOSINOPHIL # BLD AUTO: 0.03 K/UL — SIGNIFICANT CHANGE UP (ref 0–0.5)
EOSINOPHIL NFR BLD AUTO: 0.2 % — SIGNIFICANT CHANGE UP (ref 0–6)
FLUAV AG NPH QL: SIGNIFICANT CHANGE UP
FLUBV AG NPH QL: SIGNIFICANT CHANGE UP
GLUCOSE SERPL-MCNC: 84 MG/DL — SIGNIFICANT CHANGE UP (ref 70–99)
GLUCOSE UR QL: NEGATIVE MG/DL — SIGNIFICANT CHANGE UP
HCG SERPL-ACNC: HIGH MIU/ML
HCT VFR BLD CALC: 34.1 % — LOW (ref 34.5–45)
HGB BLD-MCNC: 11.7 G/DL — SIGNIFICANT CHANGE UP (ref 11.5–15.5)
IMM GRANULOCYTES NFR BLD AUTO: 0.6 % — SIGNIFICANT CHANGE UP (ref 0–0.9)
KETONES UR-MCNC: 15 MG/DL
LEUKOCYTE ESTERASE UR-ACNC: ABNORMAL
LYMPHOCYTES # BLD AUTO: 17 % — SIGNIFICANT CHANGE UP (ref 13–44)
LYMPHOCYTES # BLD AUTO: 2.32 K/UL — SIGNIFICANT CHANGE UP (ref 1–3.3)
MCHC RBC-ENTMCNC: 28.8 PG — SIGNIFICANT CHANGE UP (ref 27–34)
MCHC RBC-ENTMCNC: 34.3 G/DL — SIGNIFICANT CHANGE UP (ref 32–36)
MCV RBC AUTO: 84 FL — SIGNIFICANT CHANGE UP (ref 80–100)
MONOCYTES # BLD AUTO: 0.57 K/UL — SIGNIFICANT CHANGE UP (ref 0–0.9)
MONOCYTES NFR BLD AUTO: 4.2 % — SIGNIFICANT CHANGE UP (ref 2–14)
NEUTROPHILS # BLD AUTO: 10.63 K/UL — HIGH (ref 1.8–7.4)
NEUTROPHILS NFR BLD AUTO: 77.9 % — HIGH (ref 43–77)
NITRITE UR-MCNC: NEGATIVE — SIGNIFICANT CHANGE UP
NRBC # BLD: 0 /100 WBCS — SIGNIFICANT CHANGE UP (ref 0–0)
NRBC BLD-RTO: 0 /100 WBCS — SIGNIFICANT CHANGE UP (ref 0–0)
PH UR: 7 — SIGNIFICANT CHANGE UP (ref 5–8)
PLATELET # BLD AUTO: 379 K/UL — SIGNIFICANT CHANGE UP (ref 150–400)
POTASSIUM SERPL-MCNC: 4.3 MMOL/L — SIGNIFICANT CHANGE UP (ref 3.5–5.3)
POTASSIUM SERPL-SCNC: 4.3 MMOL/L — SIGNIFICANT CHANGE UP (ref 3.5–5.3)
PROT SERPL-MCNC: 7 G/DL — SIGNIFICANT CHANGE UP (ref 6–8.3)
PROT UR-MCNC: NEGATIVE MG/DL — SIGNIFICANT CHANGE UP
RBC # BLD: 4.06 M/UL — SIGNIFICANT CHANGE UP (ref 3.8–5.2)
RBC # FLD: 12.2 % — SIGNIFICANT CHANGE UP (ref 10.3–14.5)
RBC CASTS # UR COMP ASSIST: 4 /HPF — SIGNIFICANT CHANGE UP (ref 0–4)
RSV RNA NPH QL NAA+NON-PROBE: SIGNIFICANT CHANGE UP
SARS-COV-2 RNA SPEC QL NAA+PROBE: SIGNIFICANT CHANGE UP
SODIUM SERPL-SCNC: 135 MMOL/L — SIGNIFICANT CHANGE UP (ref 135–145)
SP GR SPEC: 1.01 — SIGNIFICANT CHANGE UP (ref 1–1.03)
SQUAMOUS # UR AUTO: 5 /HPF — SIGNIFICANT CHANGE UP (ref 0–5)
UROBILINOGEN FLD QL: 1 MG/DL — SIGNIFICANT CHANGE UP (ref 0.2–1)
WBC # BLD: 13.64 K/UL — HIGH (ref 3.8–10.5)
WBC # FLD AUTO: 13.64 K/UL — HIGH (ref 3.8–10.5)
WBC UR QL: 28 /HPF — HIGH (ref 0–5)

## 2025-01-01 RX ORDER — SODIUM CHLORIDE 9 G/1000ML
1000 INJECTION, SOLUTION INTRAVENOUS ONCE
Refills: 0 | Status: COMPLETED | OUTPATIENT
Start: 2025-01-01 | End: 2025-01-01

## 2025-01-01 RX ORDER — ACETAMINOPHEN 500 MG/5ML
1000 LIQUID (ML) ORAL ONCE
Refills: 0 | Status: COMPLETED | OUTPATIENT
Start: 2025-01-01 | End: 2025-01-01

## 2025-01-01 RX ORDER — DIPHENHYDRAMINE HCL 12.5MG/5ML
25 ELIXIR ORAL EVERY 6 HOURS
Refills: 0 | Status: DISCONTINUED | OUTPATIENT
Start: 2025-01-01 | End: 2025-01-05

## 2025-01-01 RX ORDER — METOCLOPRAMIDE HCL 10 MG
10 TABLET ORAL EVERY 6 HOURS
Refills: 0 | Status: DISCONTINUED | OUTPATIENT
Start: 2025-01-01 | End: 2025-01-05

## 2025-01-01 RX ORDER — ONDANSETRON HCL/PF 4 MG/2 ML
8 VIAL (ML) INJECTION ONCE
Refills: 0 | Status: COMPLETED | OUTPATIENT
Start: 2025-01-01 | End: 2025-01-01

## 2025-01-01 RX ORDER — ONDANSETRON HCL/PF 4 MG/2 ML
4 VIAL (ML) INJECTION EVERY 6 HOURS
Refills: 0 | Status: DISCONTINUED | OUTPATIENT
Start: 2025-01-01 | End: 2025-01-05

## 2025-01-01 RX ORDER — SODIUM CHLORIDE 9 G/1000ML
1000 INJECTION, SOLUTION INTRAVENOUS
Refills: 0 | Status: DISCONTINUED | OUTPATIENT
Start: 2025-01-01 | End: 2025-01-05

## 2025-01-01 RX ADMIN — Medication 1000 MILLIGRAM(S): at 20:26

## 2025-01-01 RX ADMIN — Medication 400 MILLIGRAM(S): at 16:57

## 2025-01-01 RX ADMIN — Medication 25 MILLIGRAM(S): at 21:01

## 2025-01-01 RX ADMIN — Medication 8 MILLIGRAM(S): at 16:57

## 2025-01-01 RX ADMIN — Medication 10 MILLIGRAM(S): at 21:01

## 2025-01-01 RX ADMIN — SODIUM CHLORIDE 1000 MILLILITER(S): 9 INJECTION, SOLUTION INTRAVENOUS at 16:57

## 2025-01-01 RX ADMIN — SODIUM CHLORIDE 100 MILLILITER(S): 9 INJECTION, SOLUTION INTRAVENOUS at 20:59

## 2025-01-02 VITALS
TEMPERATURE: 99 F | DIASTOLIC BLOOD PRESSURE: 72 MMHG | OXYGEN SATURATION: 96 % | SYSTOLIC BLOOD PRESSURE: 107 MMHG | HEART RATE: 87 BPM | RESPIRATION RATE: 18 BRPM

## 2025-01-02 LAB
ALBUMIN SERPL ELPH-MCNC: 3 G/DL — LOW (ref 3.3–5)
ALP SERPL-CCNC: 40 U/L — SIGNIFICANT CHANGE UP (ref 40–120)
ALT FLD-CCNC: 14 U/L — SIGNIFICANT CHANGE UP (ref 10–45)
ANION GAP SERPL CALC-SCNC: 11 MMOL/L — SIGNIFICANT CHANGE UP (ref 5–17)
AST SERPL-CCNC: 15 U/L — SIGNIFICANT CHANGE UP (ref 10–40)
BASOPHILS # BLD AUTO: 0.02 K/UL — SIGNIFICANT CHANGE UP (ref 0–0.2)
BASOPHILS NFR BLD AUTO: 0.2 % — SIGNIFICANT CHANGE UP (ref 0–2)
BILIRUB SERPL-MCNC: 0.2 MG/DL — SIGNIFICANT CHANGE UP (ref 0.2–1.2)
BUN SERPL-MCNC: 5 MG/DL — LOW (ref 7–23)
CALCIUM SERPL-MCNC: 8.6 MG/DL — SIGNIFICANT CHANGE UP (ref 8.4–10.5)
CHLORIDE SERPL-SCNC: 103 MMOL/L — SIGNIFICANT CHANGE UP (ref 96–108)
CO2 SERPL-SCNC: 20 MMOL/L — LOW (ref 22–31)
CREAT SERPL-MCNC: 0.53 MG/DL — SIGNIFICANT CHANGE UP (ref 0.5–1.3)
EGFR: 120 ML/MIN/1.73M2 — SIGNIFICANT CHANGE UP
EGFR: 120 ML/MIN/1.73M2 — SIGNIFICANT CHANGE UP
EOSINOPHIL # BLD AUTO: 0.08 K/UL — SIGNIFICANT CHANGE UP (ref 0–0.5)
EOSINOPHIL NFR BLD AUTO: 0.9 % — SIGNIFICANT CHANGE UP (ref 0–6)
GLUCOSE SERPL-MCNC: 100 MG/DL — HIGH (ref 70–99)
HCT VFR BLD CALC: 29.3 % — LOW (ref 34.5–45)
HGB BLD-MCNC: 10.2 G/DL — LOW (ref 11.5–15.5)
IMM GRANULOCYTES NFR BLD AUTO: 0.6 % — SIGNIFICANT CHANGE UP (ref 0–0.9)
LYMPHOCYTES # BLD AUTO: 2.04 K/UL — SIGNIFICANT CHANGE UP (ref 1–3.3)
LYMPHOCYTES # BLD AUTO: 21.8 % — SIGNIFICANT CHANGE UP (ref 13–44)
MCHC RBC-ENTMCNC: 29.4 PG — SIGNIFICANT CHANGE UP (ref 27–34)
MCHC RBC-ENTMCNC: 34.8 G/DL — SIGNIFICANT CHANGE UP (ref 32–36)
MCV RBC AUTO: 84.4 FL — SIGNIFICANT CHANGE UP (ref 80–100)
MONOCYTES # BLD AUTO: 0.56 K/UL — SIGNIFICANT CHANGE UP (ref 0–0.9)
MONOCYTES NFR BLD AUTO: 6 % — SIGNIFICANT CHANGE UP (ref 2–14)
NEUTROPHILS # BLD AUTO: 6.61 K/UL — SIGNIFICANT CHANGE UP (ref 1.8–7.4)
NEUTROPHILS NFR BLD AUTO: 70.5 % — SIGNIFICANT CHANGE UP (ref 43–77)
NRBC # BLD: 0 /100 WBCS — SIGNIFICANT CHANGE UP (ref 0–0)
NRBC BLD-RTO: 0 /100 WBCS — SIGNIFICANT CHANGE UP (ref 0–0)
PLATELET # BLD AUTO: 343 K/UL — SIGNIFICANT CHANGE UP (ref 150–400)
POTASSIUM SERPL-MCNC: 3.7 MMOL/L — SIGNIFICANT CHANGE UP (ref 3.5–5.3)
POTASSIUM SERPL-SCNC: 3.7 MMOL/L — SIGNIFICANT CHANGE UP (ref 3.5–5.3)
PROT SERPL-MCNC: 6.1 G/DL — SIGNIFICANT CHANGE UP (ref 6–8.3)
RBC # BLD: 3.47 M/UL — LOW (ref 3.8–5.2)
RBC # FLD: 12.4 % — SIGNIFICANT CHANGE UP (ref 10.3–14.5)
SODIUM SERPL-SCNC: 134 MMOL/L — LOW (ref 135–145)
WBC # BLD: 9.37 K/UL — SIGNIFICANT CHANGE UP (ref 3.8–10.5)
WBC # FLD AUTO: 9.37 K/UL — SIGNIFICANT CHANGE UP (ref 3.8–10.5)

## 2025-01-02 RX ORDER — ACETAMINOPHEN 500 MG/5ML
1000 LIQUID (ML) ORAL ONCE
Refills: 0 | Status: COMPLETED | OUTPATIENT
Start: 2025-01-02 | End: 2025-01-02

## 2025-01-02 RX ORDER — FOLIC ACID 1 MG/1
1 TABLET ORAL ONCE
Refills: 0 | Status: COMPLETED | OUTPATIENT
Start: 2025-01-02 | End: 2025-01-02

## 2025-01-02 RX ORDER — B1/B2/B3/B5/B6/B12/VIT C/FOLIC 500-0.5 MG
1 TABLET ORAL ONCE
Refills: 0 | Status: COMPLETED | OUTPATIENT
Start: 2025-01-02 | End: 2025-01-02

## 2025-01-02 RX ORDER — SODIUM CHLORIDE 9 G/1000ML
500 INJECTION, SOLUTION INTRAVENOUS
Refills: 0 | Status: DISCONTINUED | OUTPATIENT
Start: 2025-01-02 | End: 2025-01-02

## 2025-01-02 RX ORDER — CEFTRIAXONE 500 MG/1
1000 INJECTION, POWDER, FOR SOLUTION INTRAMUSCULAR; INTRAVENOUS ONCE
Refills: 0 | Status: COMPLETED | OUTPATIENT
Start: 2025-01-02 | End: 2025-01-02

## 2025-01-02 RX ORDER — DOXYLAMINE SUCCINATE AND PYRIDOXINE HYDROCHLORIDE 10; 10 MG/1; MG/1
1 TABLET, DELAYED RELEASE ORAL
Qty: 7 | Refills: 0
Start: 2025-01-02 | End: 2025-01-08

## 2025-01-02 RX ORDER — FOLIC ACID 1 MG/1
1 TABLET ORAL ONCE
Refills: 0 | Status: DISCONTINUED | OUTPATIENT
Start: 2025-01-02 | End: 2025-01-02

## 2025-01-02 RX ORDER — METOCLOPRAMIDE HCL 10 MG
10 TABLET ORAL ONCE
Refills: 0 | Status: DISCONTINUED | OUTPATIENT
Start: 2025-01-02 | End: 2025-01-05

## 2025-01-02 RX ORDER — CEPHALEXIN 250 MG/1
1 CAPSULE ORAL
Qty: 14 | Refills: 0
Start: 2025-01-02 | End: 2025-01-08

## 2025-01-02 RX ADMIN — Medication 400 MILLIGRAM(S): at 08:09

## 2025-01-02 RX ADMIN — Medication 1 TABLET(S): at 15:09

## 2025-01-02 RX ADMIN — CEFTRIAXONE 100 MILLIGRAM(S): 500 INJECTION, POWDER, FOR SOLUTION INTRAMUSCULAR; INTRAVENOUS at 02:49

## 2025-01-02 RX ADMIN — Medication 10 MILLIGRAM(S): at 15:18

## 2025-01-02 RX ADMIN — Medication 100 MILLIGRAM(S): at 15:09

## 2025-01-02 RX ADMIN — Medication 10 MILLIGRAM(S): at 08:09

## 2025-01-02 RX ADMIN — FOLIC ACID 1 MILLIGRAM(S): 1 TABLET ORAL at 15:09

## 2025-01-02 RX ADMIN — Medication 500 MILLILITER(S): at 15:10

## 2025-01-03 LAB
CULTURE RESULTS: SIGNIFICANT CHANGE UP
SPECIMEN SOURCE: SIGNIFICANT CHANGE UP

## 2025-02-21 ENCOUNTER — APPOINTMENT (OUTPATIENT)
Dept: PEDIATRIC CARDIOLOGY | Facility: CLINIC | Age: 40
End: 2025-02-21

## 2025-02-27 ENCOUNTER — APPOINTMENT (OUTPATIENT)
Dept: PEDIATRIC CARDIOLOGY | Facility: CLINIC | Age: 40
End: 2025-02-27
Payer: COMMERCIAL

## 2025-02-27 PROCEDURE — 76825 ECHO EXAM OF FETAL HEART: CPT

## 2025-02-27 PROCEDURE — 76821 MIDDLE CEREBRAL ARTERY ECHO: CPT

## 2025-02-27 PROCEDURE — 76827 ECHO EXAM OF FETAL HEART: CPT

## 2025-02-27 PROCEDURE — 99203 OFFICE O/P NEW LOW 30 MIN: CPT | Mod: 25

## 2025-02-27 PROCEDURE — 93325 DOPPLER ECHO COLOR FLOW MAPG: CPT | Mod: 59

## 2025-02-27 PROCEDURE — 76820 UMBILICAL ARTERY ECHO: CPT

## 2025-04-24 ENCOUNTER — OUTPATIENT (OUTPATIENT)
Dept: INPATIENT UNIT | Facility: HOSPITAL | Age: 40
LOS: 1 days | End: 2025-04-24
Payer: COMMERCIAL

## 2025-04-24 ENCOUNTER — APPOINTMENT (OUTPATIENT)
Dept: ANTEPARTUM | Facility: CLINIC | Age: 40
End: 2025-04-24

## 2025-04-24 VITALS
SYSTOLIC BLOOD PRESSURE: 115 MMHG | TEMPERATURE: 98 F | RESPIRATION RATE: 18 BRPM | DIASTOLIC BLOOD PRESSURE: 57 MMHG | HEART RATE: 98 BPM | OXYGEN SATURATION: 100 %

## 2025-04-24 VITALS — HEART RATE: 83 BPM | DIASTOLIC BLOOD PRESSURE: 66 MMHG | SYSTOLIC BLOOD PRESSURE: 100 MMHG

## 2025-04-24 DIAGNOSIS — O26.899 OTHER SPECIFIED PREGNANCY RELATED CONDITIONS, UNSPECIFIED TRIMESTER: ICD-10-CM

## 2025-04-24 LAB
ANION GAP SERPL CALC-SCNC: 13 MMOL/L — SIGNIFICANT CHANGE UP (ref 7–14)
APPEARANCE UR: CLEAR — SIGNIFICANT CHANGE UP
BACTERIA # UR AUTO: ABNORMAL /HPF
BILIRUB UR-MCNC: NEGATIVE — SIGNIFICANT CHANGE UP
BUN SERPL-MCNC: 10 MG/DL — SIGNIFICANT CHANGE UP (ref 7–23)
CALCIUM SERPL-MCNC: 9.3 MG/DL — SIGNIFICANT CHANGE UP (ref 8.4–10.5)
CAST: 0 /LPF — SIGNIFICANT CHANGE UP (ref 0–4)
CHLORIDE SERPL-SCNC: 99 MMOL/L — SIGNIFICANT CHANGE UP (ref 98–107)
CO2 SERPL-SCNC: 18 MMOL/L — LOW (ref 22–31)
COLOR SPEC: YELLOW — SIGNIFICANT CHANGE UP
CREAT SERPL-MCNC: 0.46 MG/DL — LOW (ref 0.5–1.3)
DIFF PNL FLD: NEGATIVE — SIGNIFICANT CHANGE UP
EGFR: 123 ML/MIN/1.73M2 — SIGNIFICANT CHANGE UP
EGFR: 123 ML/MIN/1.73M2 — SIGNIFICANT CHANGE UP
GLUCOSE SERPL-MCNC: 95 MG/DL — SIGNIFICANT CHANGE UP (ref 70–99)
GLUCOSE UR QL: NEGATIVE MG/DL — SIGNIFICANT CHANGE UP
KETONES UR-MCNC: 80 MG/DL
LEUKOCYTE ESTERASE UR-ACNC: ABNORMAL
NITRITE UR-MCNC: NEGATIVE — SIGNIFICANT CHANGE UP
PH UR: 6.5 — SIGNIFICANT CHANGE UP (ref 5–8)
POTASSIUM SERPL-MCNC: 4.1 MMOL/L — SIGNIFICANT CHANGE UP (ref 3.5–5.3)
POTASSIUM SERPL-SCNC: 4.1 MMOL/L — SIGNIFICANT CHANGE UP (ref 3.5–5.3)
PROT UR-MCNC: SIGNIFICANT CHANGE UP MG/DL
RBC CASTS # UR COMP ASSIST: 1 /HPF — SIGNIFICANT CHANGE UP (ref 0–4)
SODIUM SERPL-SCNC: 130 MMOL/L — LOW (ref 135–145)
SP GR SPEC: 1.02 — SIGNIFICANT CHANGE UP (ref 1–1.03)
SQUAMOUS # UR AUTO: 4 /HPF — SIGNIFICANT CHANGE UP (ref 0–5)
UROBILINOGEN FLD QL: 0.2 MG/DL — SIGNIFICANT CHANGE UP (ref 0.2–1)
WBC UR QL: 6 /HPF — HIGH (ref 0–5)

## 2025-04-24 PROCEDURE — 99222 1ST HOSP IP/OBS MODERATE 55: CPT | Mod: 25

## 2025-04-24 PROCEDURE — 59025 FETAL NON-STRESS TEST: CPT | Mod: 26

## 2025-04-24 RX ORDER — ACETAMINOPHEN 500 MG/5ML
0 LIQUID (ML) ORAL
Refills: 0 | DISCHARGE

## 2025-04-24 RX ORDER — DIPHENHYDRAMINE HCL 12.5MG/5ML
25 ELIXIR ORAL ONCE
Refills: 0 | Status: COMPLETED | OUTPATIENT
Start: 2025-04-24 | End: 2025-04-24

## 2025-04-24 RX ORDER — ONDANSETRON HCL/PF 4 MG/2 ML
VIAL (ML) INJECTION
Refills: 0 | DISCHARGE

## 2025-04-24 RX ORDER — METOCLOPRAMIDE HCL 10 MG
10 TABLET ORAL ONCE
Refills: 0 | Status: COMPLETED | OUTPATIENT
Start: 2025-04-24 | End: 2025-04-24

## 2025-04-24 RX ORDER — ACETAMINOPHEN 500 MG/5ML
1000 LIQUID (ML) ORAL ONCE
Refills: 0 | Status: COMPLETED | OUTPATIENT
Start: 2025-04-24 | End: 2025-04-24

## 2025-04-24 RX ORDER — SODIUM CHLORIDE 9 G/1000ML
1000 INJECTION, SOLUTION INTRAVENOUS ONCE
Refills: 0 | Status: COMPLETED | OUTPATIENT
Start: 2025-04-24 | End: 2025-04-24

## 2025-04-24 RX ADMIN — Medication 1000 MILLIGRAM(S): at 09:06

## 2025-04-24 RX ADMIN — Medication 25 MILLIGRAM(S): at 09:06

## 2025-04-24 RX ADMIN — Medication 1000 MILLIGRAM(S): at 09:40

## 2025-04-24 RX ADMIN — SODIUM CHLORIDE 1000 MILLILITER(S): 9 INJECTION, SOLUTION INTRAVENOUS at 08:58

## 2025-04-24 RX ADMIN — Medication 10 MILLIGRAM(S): at 09:06

## 2025-04-24 NOTE — OB PROVIDER TRIAGE NOTE - NSOBPROVIDERNOTE_OBGYN_ALL_OB_FT
UA ordered  Patient offered HA cocktail of Tylenol, Benadryl and Reglan, however patient declines UA, BMP ordered  Patient given HA cocktail of Tylenol, Benadryl and Reglan  oral hydration with 1L IVF LR bolus  no further episodes diarrhea here. Patient states she feels better  PO challenge given with pretzels and crackers - pt tolerated well  d/w Dr. Cantu PGY 4 and Dr. Quinn OB service attending  pt to be discharged home with  OB labor instructions  increase oral hydration  Benefiber or Metamucil for GI health  follow up with OB in office as scheduled  instructed to follow up with neurology for any continued complaints of HA

## 2025-04-24 NOTE — OB PROVIDER TRIAGE NOTE - NSHPLABSRESULTS_GEN_ALL_CORE
Urinalysis Basic - ( 24 Apr 2025 08:58 )    Color: x / Appearance: x / SG: x / pH: x  Gluc: 95 mg/dL / Ketone: x  / Bili: x / Urobili: x   Blood: x / Protein: x / Nitrite: x   Leuk Esterase: x / RBC: x / WBC x   Sq Epi: x / Non Sq Epi: x / Bacteria: x

## 2025-04-24 NOTE — OB PROVIDER TRIAGE NOTE - PLAN OF CARE
UA, BMP ordered  Patient given HA cocktail of Tylenol, Benadryl and Reglan  oral hydration with 1L IVF LR bolus  no further episodes diarrhea here. Patient states she feels better  PO challenge given with pretzels and crackers - pt tolerated well  d/w Dr. Cantu PGY 4 and Dr. Quinn OB service attending  pt to be discharged home with  OB labor instructions  increase oral hydration  Benefiber or Metamucil for GI health  follow up with OB in office as scheduled  instructed to follow up with neurology for any continued complaints of HA

## 2025-04-24 NOTE — OB RN TRIAGE NOTE - FALL HARM RISK - UNIVERSAL INTERVENTIONS
Bed in lowest position, wheels locked, appropriate side rails in place/Call bell, personal items and telephone in reach/Instruct patient to call for assistance before getting out of bed or chair/Non-slip footwear when patient is out of bed/[place] to call system/Physically safe environment - no spills, clutter or unnecessary equipment/Purposeful Proactive Rounding/Room/bathroom lighting operational, light cord in reach

## 2025-04-24 NOTE — OB PROVIDER TRIAGE NOTE - HISTORY OF PRESENT ILLNESS
39 y/o  IVF pregnancy at 31.1 weeks gestation c/o severe constant left-side abdominal pain since last night with diarrhea episodes, pain 9 out of 10 on pain scale. Patient states after eating meal prepared by  at 6pm last night, stomach pains developed and diarrhea. Patient also endorsing a severe headache for last 3 months; was seen here in 2025 and had normal MR & MRV of brain, seen by Dr. Bacilio jara of [facility] here in [place] was prescribed methylprednisone for HA which patient states did not help her and did not complete. She had EEG performed mid-March and patient was not called with results, however they told her if she did not receive a call the results were normal. She was referred to another neurologist d/t out of network insurance, however has not followed up. She states it affects her ambulating that it makes her headache worse. Patient states since her abdominal pain has been occurring from last night, her headache has been alleviated. She last took 1gm of Tylenol at 2:30a.    Denies nausea, vomiting, sick contacts, new foods, epigastric pain, visual changes, fever, chills, chest pain, contractions, leaking of fluid, vaginal bleeding. Endorses good fetal movement here in triage.    PNC with [facility]  AP course c/b AMA, IVF pregnancy (fetal echo  WNL), GDMA1, hyperemesis gravidum, fibroids (pt unsure where, but told they were small)  Last PO intake 6pm last night 41 y/o  IVF pregnancy at 31.1 weeks gestation c/o severe constant left-side abdominal pain since last night with diarrhea episodes, pain 9 out of 10 on pain scale. Patient states after eating meal prepared by  at 6pm last night, stomach pains developed and diarrhea. Patient also endorsing a severe R-sided headache for last 3 months; was seen here in 2025 and had normal MR & MRV of brain, seen by Dr. Bacilio jara of [facility] here in [place] was prescribed methylprednisone for HA which patient states did not help her and did not complete. She had EEG performed mid-March and patient was not called with results, however they told her if she did not receive a call the results were normal. She was referred to another neurologist d/t out of network insurance, however has not followed up. She states it affects her ambulating that it makes her headache worse. Patient states since her abdominal pain has been occurring from last night, her headache has been alleviated. She last took 1gm of Tylenol at 2:30a.    Denies nausea, vomiting, sick contacts, new foods, epigastric pain, visual changes, fever, chills, chest pain, contractions, leaking of fluid, vaginal bleeding. Endorses good fetal movement here in triage.    P[facility] with [facility]  AP course c/b AMA, IVF pregnancy (fetal echo  WNL), GDMA1, hyperemesis gravidum, fibroids (pt unsure where, but told they were small)  Last PO intake 6pm last night 39 y/o  IVF pregnancy at 31.1 weeks gestation c/o severe constant left-side abdominal pain since last night with diarrhea episodes, pain 9 out of 10 on pain scale. Patient states after eating meal prepared by  at 6pm last night, stomach pains developed and diarrhea. Patient states she has had constipation throughout this pregnancy, taking Colace PRN. Patient also endorsing a severe R-sided headache for last 3 months; was seen here in 2025 and had normal MR & MRV of brain, seen by Dr. Bacilio jara of [facility] here in [place] was prescribed methylprednisone for HA which patient states did not help her and did not complete. She had EEG performed mid-March and patient was not called with results, however they told her if she did not receive a call the results were normal. She was referred to another neurologist d/t out of network insurance, however has not followed up. She states it affects her ambulating that it makes her headache worse. Patient states since her abdominal pain has been occurring from last night, her headache has been alleviated. She last took 1gm of Tylenol at 2:30a.    Denies nausea, vomiting, sick contacts, new foods, epigastric pain, visual changes, fever, chills, chest pain, SOB, back pain, contractions, leaking of fluid, vaginal bleeding. Endorses good fetal movement here in triage.    [facility] with [facility]  AP course c/b AMA, IVF pregnancy (fetal echo  WNL), GDMA1, hyperemesis gravidum, fibroids (pt unsure where, but told they were small)  Last PO intake 6pm last night

## 2025-04-24 NOTE — OB RN TRIAGE NOTE - NSICDXPASTMEDICALHX_GEN_ALL_CORE_FT
PAST MEDICAL HISTORY:  Hyperemesis gravidarum before end of 22 week gestation with carbohydrate depletion

## 2025-04-24 NOTE — OB PROVIDER TRIAGE NOTE - YOU WERE IN THE HOSPITAL FOR:
Home UA, BMP ordered  Patient given HA cocktail of Tylenol, Benadryl and Reglan  oral hydration with 1L IVF LR bolus  no further episodes diarrhea here. Patient states she feels better  PO challenge given with pretzels and crackers - pt tolerated well  d/w Dr. Cantu PGY 4 and Dr. Quinn OB service attending  pt to be discharged home with  OB labor instructions  increase oral hydration  Benefiber or Metamucil for GI health  follow up with OB in office as scheduled  instructed to follow up with neurology for any continued complaints of HA

## 2025-04-24 NOTE — OB PROVIDER TRIAGE NOTE - NSHPPHYSICALEXAM_GEN_ALL_CORE
A&O x3  abdomen: tenderness when palpated LLQ, no guarding or rebound tenderness. Soft  SSE: cervix appears closed  TVUS: All images saved in ASOB        3.2-3.3cm cervical length, no cervical or dynamic changes  TAUS: All images saved in ASOB        vertex presentation        anterior placenta        CLEOPATRA 11.81        FHR 165bpm        BPP 8/8    NST :Baseline  (  145  ) BPM  Variability ( x )  Moderate   (  ) Minimal  (  ) Absent  (  )  Marked  Accelerations (  ) 15x15   ( x ) 10x10  (  ) no  Decelerations (  ) no  ( x ) Variable  (  ) Early  (  ) Late      Description __nonrecurrent_(x2)______  Contractions (  ) no  (  ) yes     Description  __________  Interpretation ( x ) reactive   (  )  non-reactive A&O x3  abdomen: tenderness when palpated LLQ, no guarding or rebound tenderness. Soft  SSE: cervix appears closed  TVUS: All images saved in ASOB        3.2-3.3cm cervical length, no cervical or dynamic changes  TAUS: All images saved in ASOB        vertex presentation        anterior placenta        CLEOPATRA 11.81        FHR 165bpm        BPP 8/8    NST :Baseline  (  145  ) BPM  Variability ( x )  Moderate   (  ) Minimal  (  ) Absent  (  )  Marked  Accelerations (  ) 15x15   ( x ) 10x10  (  ) no  Decelerations (  ) no  ( x ) Variable  (  ) Early  (  ) Late      Description __nonrecurrent_(x2)______  Contractions (  ) no  (  ) yes     Description  __________  Interpretation ( x ) reactive   (  )  non-reactive    Vital Signs Last 24 Hrs  T(C): 36.6 (24 Apr 2025 07:30), Max: 36.6 (24 Apr 2025 07:09)  T(F): 97.88 (24 Apr 2025 07:30), Max: 97.9 (24 Apr 2025 07:09)  HR: 83 (24 Apr 2025 09:52) (71 - 98)  BP: 100/66 (24 Apr 2025 09:52) (100/66 - 132/86)  BP(mean): --  RR: 18 (24 Apr 2025 07:30) (18 - 18)  SpO2: 100% (24 Apr 2025 07:09) (100% - 100%)

## 2025-04-28 DIAGNOSIS — R10.9 UNSPECIFIED ABDOMINAL PAIN: ICD-10-CM

## 2025-04-28 DIAGNOSIS — O09.523 SUPERVISION OF ELDERLY MULTIGRAVIDA, THIRD TRIMESTER: ICD-10-CM

## 2025-04-28 DIAGNOSIS — K59.00 CONSTIPATION, UNSPECIFIED: ICD-10-CM

## 2025-04-28 DIAGNOSIS — O99.891 OTHER SPECIFIED DISEASES AND CONDITIONS COMPLICATING PREGNANCY: ICD-10-CM

## 2025-04-28 DIAGNOSIS — Z3A.31 31 WEEKS GESTATION OF PREGNANCY: ICD-10-CM

## 2025-04-28 DIAGNOSIS — D21.9 BENIGN NEOPLASM OF CONNECTIVE AND OTHER SOFT TISSUE, UNSPECIFIED: ICD-10-CM

## 2025-04-28 DIAGNOSIS — R19.7 DIARRHEA, UNSPECIFIED: ICD-10-CM

## 2025-04-28 DIAGNOSIS — O26.893 OTHER SPECIFIED PREGNANCY RELATED CONDITIONS, THIRD TRIMESTER: ICD-10-CM

## 2025-04-28 DIAGNOSIS — R51.9 HEADACHE, UNSPECIFIED: ICD-10-CM

## 2025-04-28 DIAGNOSIS — O24.410 GESTATIONAL DIABETES MELLITUS IN PREGNANCY, DIET CONTROLLED: ICD-10-CM

## 2025-04-28 DIAGNOSIS — O99.613 DISEASES OF THE DIGESTIVE SYSTEM COMPLICATING PREGNANCY, THIRD TRIMESTER: ICD-10-CM

## 2025-04-28 DIAGNOSIS — O09.813 SUPERVISION OF PREGNANCY RESULTING FROM ASSISTED REPRODUCTIVE TECHNOLOGY, THIRD TRIMESTER: ICD-10-CM

## 2025-06-06 ENCOUNTER — INPATIENT (INPATIENT)
Facility: HOSPITAL | Age: 40
LOS: 5 days | Discharge: HOME | End: 2025-06-12
Attending: OBSTETRICS & GYNECOLOGY | Admitting: OBSTETRICS & GYNECOLOGY
Payer: COMMERCIAL

## 2025-06-06 ENCOUNTER — ASOB RESULT (OUTPATIENT)
Age: 40
End: 2025-06-06

## 2025-06-06 ENCOUNTER — APPOINTMENT (OUTPATIENT)
Dept: ANTEPARTUM | Facility: CLINIC | Age: 40
End: 2025-06-06

## 2025-06-06 VITALS
TEMPERATURE: 99 F | HEART RATE: 84 BPM | RESPIRATION RATE: 16 BRPM | DIASTOLIC BLOOD PRESSURE: 95 MMHG | SYSTOLIC BLOOD PRESSURE: 147 MMHG

## 2025-06-06 DIAGNOSIS — Z36.89 ENCOUNTER FOR OTHER SPECIFIED ANTENATAL SCREENING: ICD-10-CM

## 2025-06-06 DIAGNOSIS — O16.9 UNSPECIFIED MATERNAL HYPERTENSION, UNSPECIFIED TRIMESTER: ICD-10-CM

## 2025-06-06 DIAGNOSIS — Z98.890 OTHER SPECIFIED POSTPROCEDURAL STATES: Chronic | ICD-10-CM

## 2025-06-06 DIAGNOSIS — Z34.90 ENCOUNTER FOR SUPERVISION OF NORMAL PREGNANCY, UNSPECIFIED, UNSPECIFIED TRIMESTER: ICD-10-CM

## 2025-06-06 DIAGNOSIS — O26.899 OTHER SPECIFIED PREGNANCY RELATED CONDITIONS, UNSPECIFIED TRIMESTER: ICD-10-CM

## 2025-06-06 DIAGNOSIS — O14.10 SEVERE PRE-ECLAMPSIA, UNSPECIFIED TRIMESTER: ICD-10-CM

## 2025-06-06 LAB
ALBUMIN SERPL ELPH-MCNC: 3.4 G/DL — SIGNIFICANT CHANGE UP (ref 3.3–5)
ALP SERPL-CCNC: 79 U/L — SIGNIFICANT CHANGE UP (ref 40–120)
ALT FLD-CCNC: 15 U/L — SIGNIFICANT CHANGE UP (ref 4–33)
ANION GAP SERPL CALC-SCNC: 9 MMOL/L — SIGNIFICANT CHANGE UP (ref 7–14)
APPEARANCE UR: ABNORMAL
AST SERPL-CCNC: 19 U/L — SIGNIFICANT CHANGE UP (ref 4–32)
BACTERIA # UR AUTO: ABNORMAL /HPF
BASOPHILS # BLD AUTO: 0.02 K/UL — SIGNIFICANT CHANGE UP (ref 0–0.2)
BASOPHILS NFR BLD AUTO: 0.2 % — SIGNIFICANT CHANGE UP (ref 0–2)
BILIRUB SERPL-MCNC: 0.2 MG/DL — SIGNIFICANT CHANGE UP (ref 0.2–1.2)
BILIRUB UR-MCNC: NEGATIVE — SIGNIFICANT CHANGE UP
BLD GP AB SCN SERPL QL: NEGATIVE — SIGNIFICANT CHANGE UP
BUN SERPL-MCNC: 12 MG/DL — SIGNIFICANT CHANGE UP (ref 7–23)
CALCIUM SERPL-MCNC: 8.7 MG/DL — SIGNIFICANT CHANGE UP (ref 8.4–10.5)
CAST: 6 /LPF — HIGH (ref 0–4)
CHLORIDE SERPL-SCNC: 104 MMOL/L — SIGNIFICANT CHANGE UP (ref 98–107)
CO2 SERPL-SCNC: 20 MMOL/L — LOW (ref 22–31)
COLOR SPEC: YELLOW — SIGNIFICANT CHANGE UP
CREAT ?TM UR-MCNC: 185 MG/DL — SIGNIFICANT CHANGE UP
CREAT SERPL-MCNC: 0.58 MG/DL — SIGNIFICANT CHANGE UP (ref 0.5–1.3)
DIFF PNL FLD: NEGATIVE — SIGNIFICANT CHANGE UP
EGFR: 117 ML/MIN/1.73M2 — SIGNIFICANT CHANGE UP
EGFR: 117 ML/MIN/1.73M2 — SIGNIFICANT CHANGE UP
EOSINOPHIL # BLD AUTO: 0.01 K/UL — SIGNIFICANT CHANGE UP (ref 0–0.5)
EOSINOPHIL NFR BLD AUTO: 0.1 % — SIGNIFICANT CHANGE UP (ref 0–6)
GLUCOSE BLDC GLUCOMTR-MCNC: 82 MG/DL — SIGNIFICANT CHANGE UP (ref 70–99)
GLUCOSE BLDC GLUCOMTR-MCNC: 82 MG/DL — SIGNIFICANT CHANGE UP (ref 70–99)
GLUCOSE BLDC GLUCOMTR-MCNC: 99 MG/DL — SIGNIFICANT CHANGE UP (ref 70–99)
GLUCOSE SERPL-MCNC: 80 MG/DL — SIGNIFICANT CHANGE UP (ref 70–99)
GLUCOSE UR QL: NEGATIVE MG/DL — SIGNIFICANT CHANGE UP
HCT VFR BLD CALC: 32.5 % — LOW (ref 34.5–45)
HGB BLD-MCNC: 10.8 G/DL — LOW (ref 11.5–15.5)
IANC: 8.29 K/UL — HIGH (ref 1.8–7.4)
IMM GRANULOCYTES NFR BLD AUTO: 0.6 % — SIGNIFICANT CHANGE UP (ref 0–0.9)
KETONES UR QL: NEGATIVE MG/DL — SIGNIFICANT CHANGE UP
LDH SERPL L TO P-CCNC: 189 U/L — SIGNIFICANT CHANGE UP (ref 135–225)
LEUKOCYTE ESTERASE UR-ACNC: ABNORMAL
LYMPHOCYTES # BLD AUTO: 1.95 K/UL — SIGNIFICANT CHANGE UP (ref 1–3.3)
LYMPHOCYTES # BLD AUTO: 17.9 % — SIGNIFICANT CHANGE UP (ref 13–44)
MAGNESIUM SERPL-MCNC: 5.3 MG/DL — HIGH (ref 1.6–2.6)
MCHC RBC-ENTMCNC: 27.1 PG — SIGNIFICANT CHANGE UP (ref 27–34)
MCHC RBC-ENTMCNC: 33.2 G/DL — SIGNIFICANT CHANGE UP (ref 32–36)
MCV RBC AUTO: 81.7 FL — SIGNIFICANT CHANGE UP (ref 80–100)
MONOCYTES # BLD AUTO: 0.53 K/UL — SIGNIFICANT CHANGE UP (ref 0–0.9)
MONOCYTES NFR BLD AUTO: 4.9 % — SIGNIFICANT CHANGE UP (ref 2–14)
NEUTROPHILS # BLD AUTO: 8.29 K/UL — HIGH (ref 1.8–7.4)
NEUTROPHILS NFR BLD AUTO: 76.3 % — SIGNIFICANT CHANGE UP (ref 43–77)
NITRITE UR-MCNC: NEGATIVE — SIGNIFICANT CHANGE UP
NRBC # BLD AUTO: 0 K/UL — SIGNIFICANT CHANGE UP (ref 0–0)
NRBC # FLD: 0 K/UL — SIGNIFICANT CHANGE UP (ref 0–0)
NRBC BLD AUTO-RTO: 0 /100 WBCS — SIGNIFICANT CHANGE UP (ref 0–0)
PH UR: 7 — SIGNIFICANT CHANGE UP (ref 5–8)
PLATELET # BLD AUTO: 331 K/UL — SIGNIFICANT CHANGE UP (ref 150–400)
POTASSIUM SERPL-MCNC: 4.5 MMOL/L — SIGNIFICANT CHANGE UP (ref 3.5–5.3)
POTASSIUM SERPL-SCNC: 4.5 MMOL/L — SIGNIFICANT CHANGE UP (ref 3.5–5.3)
PROT ?TM UR-MCNC: 56 MG/DL — SIGNIFICANT CHANGE UP
PROT SERPL-MCNC: 6.7 G/DL — SIGNIFICANT CHANGE UP (ref 6–8.3)
PROT UR-MCNC: 100 MG/DL
PROT/CREAT UR-RTO: 0.3 RATIO — HIGH (ref 0–0.2)
RBC # BLD: 3.98 M/UL — SIGNIFICANT CHANGE UP (ref 3.8–5.2)
RBC # FLD: 13.1 % — SIGNIFICANT CHANGE UP (ref 10.3–14.5)
RBC CASTS # UR COMP ASSIST: 3 /HPF — SIGNIFICANT CHANGE UP (ref 0–4)
REVIEW: SIGNIFICANT CHANGE UP
RH IG SCN BLD-IMP: POSITIVE — SIGNIFICANT CHANGE UP
RH IG SCN BLD-IMP: POSITIVE — SIGNIFICANT CHANGE UP
SODIUM SERPL-SCNC: 133 MMOL/L — LOW (ref 135–145)
SP GR SPEC: 1.02 — SIGNIFICANT CHANGE UP (ref 1–1.03)
SQUAMOUS # UR AUTO: 6 /HPF — HIGH (ref 0–5)
URATE SERPL-MCNC: 4.9 MG/DL — SIGNIFICANT CHANGE UP (ref 2.5–7)
UROBILINOGEN FLD QL: 1 MG/DL — SIGNIFICANT CHANGE UP (ref 0.2–1)
WBC # BLD: 10.87 K/UL — HIGH (ref 3.8–10.5)
WBC # FLD AUTO: 10.87 K/UL — HIGH (ref 3.8–10.5)
WBC UR QL: 47 /HPF — HIGH (ref 0–5)

## 2025-06-06 PROCEDURE — 76815 OB US LIMITED FETUS(S): CPT | Mod: 26

## 2025-06-06 RX ORDER — LABETALOL HYDROCHLORIDE 200 MG/1
20 TABLET, FILM COATED ORAL ONCE
Refills: 0 | Status: COMPLETED | OUTPATIENT
Start: 2025-06-06 | End: 2025-06-06

## 2025-06-06 RX ORDER — OXYTOCIN-SODIUM CHLORIDE 0.9% IV SOLN 30 UNIT/500ML 30-0.9/5 UT/ML-%
SOLUTION INTRAVENOUS
Qty: 30 | Refills: 0 | Status: DISCONTINUED | OUTPATIENT
Start: 2025-06-06 | End: 2025-06-07

## 2025-06-06 RX ORDER — SODIUM CHLORIDE 9 G/1000ML
1000 INJECTION, SOLUTION INTRAVENOUS
Refills: 0 | Status: DISCONTINUED | OUTPATIENT
Start: 2025-06-06 | End: 2025-06-07

## 2025-06-06 RX ORDER — DIPHENHYDRAMINE HCL 12.5MG/5ML
25 ELIXIR ORAL ONCE
Refills: 0 | Status: COMPLETED | OUTPATIENT
Start: 2025-06-06 | End: 2025-06-06

## 2025-06-06 RX ORDER — SODIUM CHLORIDE 9 G/1000ML
1000 INJECTION, SOLUTION INTRAVENOUS
Refills: 0 | Status: DISCONTINUED | OUTPATIENT
Start: 2025-06-06 | End: 2025-06-06

## 2025-06-06 RX ORDER — METOCLOPRAMIDE HCL 10 MG
TABLET ORAL
Refills: 0 | DISCHARGE

## 2025-06-06 RX ORDER — METOCLOPRAMIDE HCL 10 MG
10 TABLET ORAL ONCE
Refills: 0 | Status: COMPLETED | OUTPATIENT
Start: 2025-06-06 | End: 2025-06-06

## 2025-06-06 RX ORDER — MAGNESIUM SULFATE 500 MG/ML
4 SYRINGE (ML) INJECTION ONCE
Refills: 0 | Status: COMPLETED | OUTPATIENT
Start: 2025-06-06 | End: 2025-06-06

## 2025-06-06 RX ORDER — ACETAMINOPHEN 500 MG/5ML
1000 LIQUID (ML) ORAL ONCE
Refills: 0 | Status: COMPLETED | OUTPATIENT
Start: 2025-06-06 | End: 2025-06-06

## 2025-06-06 RX ORDER — NIFEDIPINE 30 MG
30 TABLET, EXTENDED RELEASE 24 HR ORAL DAILY
Refills: 0 | Status: DISCONTINUED | OUTPATIENT
Start: 2025-06-06 | End: 2025-06-10

## 2025-06-06 RX ORDER — SODIUM CHLORIDE 9 G/1000ML
1000 INJECTION, SOLUTION INTRAVENOUS ONCE
Refills: 0 | Status: COMPLETED | OUTPATIENT
Start: 2025-06-06 | End: 2025-06-06

## 2025-06-06 RX ORDER — MAGNESIUM SULFATE 500 MG/ML
2 SYRINGE (ML) INJECTION
Qty: 40 | Refills: 0 | Status: COMPLETED | OUTPATIENT
Start: 2025-06-06 | End: 2025-06-07

## 2025-06-06 RX ADMIN — Medication 400 MILLIGRAM(S): at 18:37

## 2025-06-06 RX ADMIN — Medication 1000 MILLIGRAM(S): at 20:17

## 2025-06-06 RX ADMIN — LABETALOL HYDROCHLORIDE 20 MILLIGRAM(S): 200 TABLET, FILM COATED ORAL at 12:52

## 2025-06-06 RX ADMIN — SODIUM CHLORIDE 50 MILLILITER(S): 9 INJECTION, SOLUTION INTRAVENOUS at 14:00

## 2025-06-06 RX ADMIN — Medication 300 GRAM(S): at 13:46

## 2025-06-06 RX ADMIN — Medication 25 MILLIGRAM(S): at 18:40

## 2025-06-06 RX ADMIN — Medication 1 APPLICATION(S): at 14:04

## 2025-06-06 RX ADMIN — Medication 25 MILLIGRAM(S): at 12:19

## 2025-06-06 RX ADMIN — Medication 3 MILLILITER(S): at 13:40

## 2025-06-06 RX ADMIN — Medication 400 MILLIGRAM(S): at 12:20

## 2025-06-06 RX ADMIN — Medication 30 MILLIGRAM(S): at 14:04

## 2025-06-06 RX ADMIN — Medication 1 APPLICATION(S): at 20:18

## 2025-06-06 RX ADMIN — Medication 50 GM/HR: at 14:29

## 2025-06-06 RX ADMIN — OXYTOCIN-SODIUM CHLORIDE 0.9% IV SOLN 30 UNIT/500ML 2 MILLIUNIT(S)/MIN: 30-0.9/5 SOLUTION at 20:44

## 2025-06-06 RX ADMIN — Medication 1000 MILLIGRAM(S): at 12:50

## 2025-06-06 RX ADMIN — Medication 10 MILLIGRAM(S): at 18:38

## 2025-06-06 RX ADMIN — SODIUM CHLORIDE 1000 MILLILITER(S): 9 INJECTION, SOLUTION INTRAVENOUS at 12:18

## 2025-06-06 RX ADMIN — Medication 10 MILLIGRAM(S): at 12:18

## 2025-06-06 NOTE — OB PROVIDER TRIAGE NOTE - NSHPLABSRESULTS_GEN_ALL_CORE
PEC labs PEC labs  CBC Full  -  ( 2025 11:58 )  WBC Count : 10.87 K/uL  RBC Count : 3.98 M/uL  Hemoglobin : 10.8 g/dL  Hematocrit : 32.5 %  Platelet Count - Automated : 331 K/uL  Mean Cell Volume : 81.7 fL  Mean Cell Hemoglobin : 27.1 pg  Mean Cell Hemoglobin Concentration : 33.2 g/dL  Auto Neutrophil # : x  Auto Lymphocyte # : x  Auto Monocyte # : x  Auto Eosinophil # : x  Auto Basophil # : x  Auto Neutrophil % : x  Auto Lymphocyte % : x  Auto Monocyte % : x  Auto Eosinophil % : x  Auto Basophil % : x    Urinalysis Basic - ( 2025 11:58 )    Color: Yellow / Appearance: Cloudy / S.024 / pH: x  Gluc: 80 mg/dL / Ketone: x  / Bili: Negative / Urobili: 1.0 mg/dL   Blood: x / Protein: 100 mg/dL / Nitrite: Negative   Leuk Esterase: Moderate / RBC: 3 /HPF / WBC 47 /HPF   Sq Epi: x / Non Sq Epi: 6 /HPF / Bacteria: Many /HPF    PCR: 0.3         133[L]  |  104  |  12  ----------------------------<  80  4.5   |  20[L]  |  0.58    Ca    8.7      2025 11:58    TPro  6.7  /  Alb  3.4  /  TBili  0.2  /  DBili  x   /  AST  19  /  ALT  15  /  AlkPhos  79  -    uric acid: 4.9  LDH: 189    FS 82 @ 1315

## 2025-06-06 NOTE — OB PROVIDER TRIAGE NOTE - NSHPPHYSICALEXAM_GEN_ALL_CORE
GENERAL: pt in  NAD,   HEAD:  Atraumatic, normocephalic  EYES:PERRLA, conjunctiva and sclera clear  NECK: Supple, nontender   HEART: Regular rate and rhythm,   LUNGS: Unlabored respirations.  Clear to auscultation bilaterally   ABDOMEN: Soft, nontender, gravid     chaperone by :   VE    SSE   scan  ABD    scan  TV     images saved to ASOb    EXTREMITIES: 2+ peripheral pulses bilaterally. No clubbing, cyanosis, or edema  NERVOUS SYSTEM:  A&Ox3, moving all extremities, no focal deficits   SKIN: No rashes or lesions  NST   vitals   T(C): 37 (06-06-25 @ 11:34), Max: 37 (06-06-25 @ 11:34)  HR: 77 (06-06-25 @ 12:10) (77 - 84)  BP: 156/87 (06-06-25 @ 12:10) (147/95 - 156/87) high fowlers position   RR: 16 (06-06-25 @ 11:34) (16 - 16)  SpO2: -- GENERAL: pt in  NAD,   HEAD:  Atraumatic, normocephalic  EYES: PERRLA, conjunctiva and sclera clear  NECK: Supple, nontender   HEART: Regular rate and rhythm,   LUNGS: Unlabored respirations.  Clear to auscultation bilaterally   ABDOMEN: Soft, nontender, gravid     chaperone by : Dr Chad NOLASCO 1/60/-3   scan  ABD   OB limited sono   cephalic MVP: 3.70 posterior placenta FH: 129 bpm    images saved to ASOB     EXTREMITIES: 2+ peripheral pulses bilaterally. No clubbing, cyanosis, or edema  NERVOUS SYSTEM:  A&Ox3, moving all extremities, no focal deficits   SKIN: No rashes or lesions  NST  Baseline  (    130      ) BPM  Variability ( x )  Moderate   (  ) Minimal  (  ) Absent  (  )  Marked  Accelerations (x ) 15x15   (  ) 10x10  (  ) no  Decelerations ( x ) no  (  ) Variable  (  ) Early  (  ) Late      Description _________  Contractions (  x) no  (  ) yes     Description  __________  Interpretation ( x ) reactive   (  )  non-reactive  vitals   T(C): 37 (06-06-25 @ 11:34), Max: 37 (06-06-25 @ 11:34)  HR: 77 (06-06-25 @ 12:10) (77 - 84)  BP: 156/87 (06-06-25 @ 12:10) (147/95 - 156/87) high fowlers position   RR: 16 (06-06-25 @ 11:34) (16 - 16)  SpO2: --  1225 163/88 P: 73 high fowlers position  1240 170/93 P: 89 high fowlers position   1252 labetalol 20 mg IVP   1255 137/86 P: 75

## 2025-06-06 NOTE — OB PROVIDER LABOR PROGRESS NOTE - ASSESSMENT
VC placed   CB placed     Machelle Canals PGY1
-s/p vaginal cytotec and cervical balloon  -plan to start pitocin  -continue EFM/toco    Ting Moon PGY1
- pt comfortable with epidural   - patient making cervical change   - SROM previously, clear flid  - c/w pitocin   - Consider IUPC with next exam if not making change     Shyann Cantu, PGY4  d/w Dr. Dillard

## 2025-06-06 NOTE — OB PROVIDER LABOR PROGRESS NOTE - NS_SUBJECTIVE/OBJECTIVE_OBGYN_ALL_OB_FT
Reassessed patient in her labor course.
VE to evaluate progress in labor. Patient feeling increased pain and pressure.
Pt seen and examined at bedside.

## 2025-06-06 NOTE — OB PROVIDER H&P - BIRTH SEX
[facility] Care Coordination Note    Encounter Summary:  Follow up phone call, left message, will attempt again in 1-2 weeks.       [facility] Assessments    See Care Coordination Smartform for complete baseline and current assessment.         
Female

## 2025-06-06 NOTE — OB PROVIDER H&P - ATTENDING COMMENTS
P1 at 37+ weeks with severe PEC  For mag sulfate  Plan for CB and vag cyto  Anticipate     Dangelo Raygoza MD

## 2025-06-06 NOTE — OB PROVIDER TRIAGE NOTE - NSICDXPASTMEDICALHX_GEN_ALL_CORE_FT
PAST MEDICAL HISTORY:  Fibroids     Hyperemesis gravidarum before end of 22 week gestation with carbohydrate depletion     Migraine

## 2025-06-06 NOTE — OB PROVIDER LABOR PROGRESS NOTE - NS_ADDCERVICALEXAM_OBGYN_ALL_OB
Chief Complaint   Patient presents with   George Robertson is a pleasant 51year old male who presents as a follow up for throat cancer.  He denies pain
Click to add…

## 2025-06-06 NOTE — OB PROVIDER H&P - NS_TUBALPLANNED_OBGYN_ALL_OB
No Maureen Fenton MD  Division of Hospital Medicine  [facility]   Available on Microsoft Teams - messages preferred prior to calls.    Patient seen and examined today with Team 2. Agree with above findings, assessment, and plan with the following additions/exceptions:    Overall, 67 yo female with PMH of morbid obesity, moderate persistent asthma, HTN, HLD, DM, CKD3, HFpEF on 4-6L NC oxygen at baseline (not on BiPAP/CPAP qhs) who presents with acute worsening dyspnea admitted for acute decompensated heart failure.    Active Issues:  #Acute Hypoxic Respiratory Failure  #Acute Decompensated Heart Failure  #Anarsarca  #Elevated D-Dimer  #Allergic Conjunctivitis  #CKD    Plan:  - c/w lasix 40mg IV BID. was net negative 1.07 L in last 24 hours  - goal net neg 1-2L/day   - f/u TTE - pending  - didn't tolerate BIPAP overnight, but will attempt again tonight at 12/5  - Cr stable  - f/u duplex of LE to r/o VTE given elevated d-dimer  - complaining of conjunctival pruritis s/p trial of olopatadine drops as outpatient with no relief  - trial of ketotifen drops BID    Rest as detailed in note above.    Plan discussed with patient and Team 2 resident Dr. Gaston. Maureen Fenton MD  Division of Hospital Medicine  [facility]   Available on Microsoft Teams - messages preferred prior to calls.    Patient seen and examined today with Team 2. Agree with above findings, assessment, and plan with the following additions/exceptions:    Overall, 67 yo female with PMH of morbid obesity, moderate persistent asthma, HTN, HLD, DM, CKD3, HFpEF on 4-6L NC oxygen at baseline (not on BiPAP/CPAP qhs) who presents with acute worsening dyspnea admitted for acute decompensated heart failure.    Active Issues:  #Acute Hypoxic Respiratory Failure  #Acute Decompensated Heart Failure  #Anasarca  #Elevated D-Dimer  #Allergic Conjunctivitis  #CKD  #Insulin-Dependent T2DM with hyperglycemia    Plan:  - c/w lasix 40mg IV BID. was net negative 1.07 L in last 24 hours  - goal net neg 1-2L/day   - f/u TTE - pending  - didn't tolerate BIPAP overnight, but will attempt again tonight at 12/5  - Cr stable  - f/u duplex of LE to r/o VTE given elevated d-dimer  - complaining of conjunctival pruritis s/p trial of olopatadine drops as outpatient with no relief  - trial of ketotifen drops BID  - pre-meal admelog added today. anticipate need to increase basal/bolus further    Rest as detailed in note above.    Plan discussed with patient and Team 2 resident Dr. Gaston.

## 2025-06-06 NOTE — OB PROVIDER H&P - NSHPPHYSICALEXAM_GEN_ALL_CORE
GENERAL: pt in  NAD,   HEAD:  Atraumatic, normocephalic  EYES: PERRLA, conjunctiva and sclera clear  NECK: Supple, nontender   HEART: Regular rate and rhythm,   LUNGS: Unlabored respirations.  Clear to auscultation bilaterally   ABDOMEN: Soft, nontender, gravid     chaperone by : Dr Chad NOLASCO 1/60/-3   scan  ABD   OB limited sono   cephalic MVP: 3.70 posterior placenta FH: 129 bpm    images saved to ASOB     EXTREMITIES: 2+ peripheral pulses bilaterally. No clubbing, cyanosis, or edema  NERVOUS SYSTEM:  A&Ox3, moving all extremities, no focal deficits   SKIN: No rashes or lesions  NST  Baseline  (    130      ) BPM  Variability ( x )  Moderate   (  ) Minimal  (  ) Absent  (  )  Marked  Accelerations (x ) 15x15   (  ) 10x10  (  ) no  Decelerations ( x ) no  (  ) Variable  (  ) Early  (  ) Late      Description _________  Contractions (  x) no  (  ) yes     Description  __________  Interpretation ( x ) reactive   (  )  non-reactive  vitals   T(C): 37 (06-06-25 @ 11:34), Max: 37 (06-06-25 @ 11:34)  HR: 77 (06-06-25 @ 12:10) (77 - 84)  BP: 156/87 (06-06-25 @ 12:10) (147/95 - 156/87) high fowlers position   RR: 16 (06-06-25 @ 11:34) (16 - 16)  SpO2: --  1225 163/88 P: 73 high fowlers position  1240 170/93 P: 89 high fowlers position   1252 labetalol 20 mg IVP   1255 137/86 P: 75

## 2025-06-06 NOTE — OB PROVIDER TRIAGE NOTE - NSOBPROVIDERNOTE_OBGYN_ALL_OB_FT
lactated ringer bolus   IV Tylenol 1000 mg IVPB  Reglan 10 mg IVP  Benadryl 25 mg IVP x's 1   will continue to monitor lactated ringer bolus   IV Tylenol 1000 mg IVPB@ 1220  Reglan 10 mg IVP @ 1218  Benadryl 25 mg IVP x's 1 @ 1219   will continue to monitor    pt with " some" relief states her pain is 5/10  Dr Bonilla @ bedside to evaluate patient   plan of care d/w dr Cross / Dr sutton   pt to be admitted to l&D  [facility] with severe features @ 37.2 wks gestation / Magnesium Sulfate / Labetalol / Procardia for IOL   see admission orders    Risks, benefits, alternatives, and possible complications have been discussed in detail with the patient in her native language. Pre-admission, admission, and post admission procedures and expectations were discussed in detail. All questions answered, all appropriate hospital consents were signed. Anticipate normal vaginal delivery.   Informed consent was obtained. The following was discussed:   - Induction/augmentation of labor: use of medication and/or cook balloon to begin or enhance labor   - Obstetrical management including internal fetal/contraction monitoring   - Normal vaginal delivery   - Possible  section

## 2025-06-06 NOTE — OB PROVIDER TRIAGE NOTE - HISTORY OF PRESENT ILLNESS
PNC with [facility]   39 y/o  @ 37.2 wks gestation presents from [facility] with elevated BP in office 158/105 and 135/99 pt with migraine headaches throughout pregnancy and states her headache is 10/10 and has photophobia , pt was followed by neurology Dr Quiroz and states head imaging normal and was taking Reglan/ benadryl / Tylenol and last took 2025 @ 1200 no relief denies any right upper epigastric pain denies any n/v/d denies any fever or chills denies any uc's vb or lof reports +FM ap  care comp by :   migraines   scheduled IOL @ 38 wks gestation

## 2025-06-06 NOTE — OB PROVIDER LABOR PROGRESS NOTE - NS_OBIHIFHRDETAILS_OBGYN_ALL_OB_FT
145, moderate, +accels, no decels
baseline 125/mod variability/+accels/-decels
145/mod tom/+accels/-decels

## 2025-06-06 NOTE — OB RN TRIAGE NOTE - FALL HARM RISK - UNIVERSAL INTERVENTIONS
Bed in lowest position, wheels locked, appropriate side rails in place/Call bell, personal items and telephone in reach/Instruct patient to call for assistance before getting out of bed or chair/Non-slip footwear when patient is out of bed/[place] to call system/Physically safe environment - no spills, clutter or unnecessary equipment/Purposeful Proactive Rounding/Room/bathroom lighting operational, light cord in reach

## 2025-06-06 NOTE — OB PROVIDER H&P - ASSESSMENT
lactated ringer bolus   IV Tylenol 1000 mg IVPB@ 1220  Reglan 10 mg IVP @ 1218  Benadryl 25 mg IVP x's 1 @ 1219   will continue to monitor    pt with " some" relief states her pain is 5/10  Dr Bonilla @ bedside to evaluate patient   plan of care d/w dr Cross / Dr sutton   pt to be admitted to l&D  [facility] with severe features @ 37.2 wks gestation / Magnesium Sulfate / Labetalol / Procardia for vaginal  IOL   see admission orders    Risks, benefits, alternatives, and possible complications have been discussed in detail with the patient in her native language. Pre-admission, admission, and post admission procedures and expectations were discussed in detail. All questions answered, all appropriate hospital consents were signed. Anticipate normal vaginal delivery.   Informed consent was obtained. The following was discussed:   - Induction/augmentation of labor: use of medication and/or cook balloon to begin or enhance labor   - Obstetrical management including internal fetal/contraction monitoring   - Normal vaginal delivery   - Possible  section

## 2025-06-06 NOTE — OB PROVIDER H&P - NSHPLABSRESULTS_GEN_ALL_CORE
PEC labs  CBC Full  -  ( 2025 11:58 )  WBC Count : 10.87 K/uL  RBC Count : 3.98 M/uL  Hemoglobin : 10.8 g/dL  Hematocrit : 32.5 %  Platelet Count - Automated : 331 K/uL  Mean Cell Volume : 81.7 fL  Mean Cell Hemoglobin : 27.1 pg  Mean Cell Hemoglobin Concentration : 33.2 g/dL  Auto Neutrophil # : x  Auto Lymphocyte # : x  Auto Monocyte # : x  Auto Eosinophil # : x  Auto Basophil # : x  Auto Neutrophil % : x  Auto Lymphocyte % : x  Auto Monocyte % : x  Auto Eosinophil % : x  Auto Basophil % : x    Urinalysis Basic - ( 2025 11:58 )    Color: Yellow / Appearance: Cloudy / S.024 / pH: x  Gluc: 80 mg/dL / Ketone: x  / Bili: Negative / Urobili: 1.0 mg/dL   Blood: x / Protein: 100 mg/dL / Nitrite: Negative   Leuk Esterase: Moderate / RBC: 3 /HPF / WBC 47 /HPF   Sq Epi: x / Non Sq Epi: 6 /HPF / Bacteria: Many /HPF    PCR: 0.3         133[L]  |  104  |  12  ----------------------------<  80  4.5   |  20[L]  |  0.58    Ca    8.7      2025 11:58    TPro  6.7  /  Alb  3.4  /  TBili  0.2  /  DBili  x   /  AST  19  /  ALT  15  /  AlkPhos  79  -    uric acid: 4.9  LDH: 189    FS 82 @ 1315

## 2025-06-07 LAB
GLUCOSE BLDC GLUCOMTR-MCNC: 94 MG/DL — SIGNIFICANT CHANGE UP (ref 70–99)
MAGNESIUM SERPL-MCNC: 5.9 MG/DL — HIGH (ref 1.6–2.6)
MAGNESIUM SERPL-MCNC: 6 MG/DL — HIGH (ref 1.6–2.6)
MAGNESIUM SERPL-MCNC: 6.1 MG/DL — HIGH (ref 1.6–2.6)
MAGNESIUM SERPL-MCNC: 6.2 MG/DL — HIGH (ref 1.6–2.6)
T PALLIDUM AB TITR SER: NEGATIVE — SIGNIFICANT CHANGE UP

## 2025-06-07 PROCEDURE — 12345F: CUSTOM | Mod: NC

## 2025-06-07 RX ORDER — PRENATAL 136/IRON/FOLIC ACID 27 MG-1 MG
1 TABLET ORAL DAILY
Refills: 0 | Status: DISCONTINUED | OUTPATIENT
Start: 2025-06-07 | End: 2025-06-12

## 2025-06-07 RX ORDER — OXYCODONE HYDROCHLORIDE 30 MG/1
5 TABLET ORAL
Refills: 0 | Status: DISCONTINUED | OUTPATIENT
Start: 2025-06-07 | End: 2025-06-12

## 2025-06-07 RX ORDER — METOCLOPRAMIDE HCL 10 MG
10 TABLET ORAL ONCE
Refills: 0 | Status: COMPLETED | OUTPATIENT
Start: 2025-06-07 | End: 2025-06-07

## 2025-06-07 RX ORDER — MODIFIED LANOLIN 100 %
1 CREAM (GRAM) TOPICAL EVERY 6 HOURS
Refills: 0 | Status: DISCONTINUED | OUTPATIENT
Start: 2025-06-07 | End: 2025-06-12

## 2025-06-07 RX ORDER — MAGNESIUM SULFATE 500 MG/ML
2 SYRINGE (ML) INJECTION
Qty: 40 | Refills: 0 | Status: DISCONTINUED | OUTPATIENT
Start: 2025-06-07 | End: 2025-06-08

## 2025-06-07 RX ORDER — WITCH HAZEL LEAF
1 FLUID EXTRACT MISCELLANEOUS EVERY 4 HOURS
Refills: 0 | Status: DISCONTINUED | OUTPATIENT
Start: 2025-06-07 | End: 2025-06-12

## 2025-06-07 RX ORDER — OXYCODONE HYDROCHLORIDE 30 MG/1
5 TABLET ORAL ONCE
Refills: 0 | Status: DISCONTINUED | OUTPATIENT
Start: 2025-06-07 | End: 2025-06-12

## 2025-06-07 RX ORDER — DIPHENHYDRAMINE HCL 12.5MG/5ML
25 ELIXIR ORAL ONCE
Refills: 0 | Status: COMPLETED | OUTPATIENT
Start: 2025-06-07 | End: 2025-06-07

## 2025-06-07 RX ORDER — BENZOCAINE 220 MG/G
1 SPRAY, METERED PERIODONTAL EVERY 6 HOURS
Refills: 0 | Status: DISCONTINUED | OUTPATIENT
Start: 2025-06-07 | End: 2025-06-12

## 2025-06-07 RX ORDER — SODIUM CHLORIDE 9 G/1000ML
1000 INJECTION, SOLUTION INTRAVENOUS
Refills: 0 | Status: DISCONTINUED | OUTPATIENT
Start: 2025-06-07 | End: 2025-06-08

## 2025-06-07 RX ORDER — OXYTOCIN-SODIUM CHLORIDE 0.9% IV SOLN 30 UNIT/500ML 30-0.9/5 UT/ML-%
167 SOLUTION INTRAVENOUS
Qty: 30 | Refills: 0 | Status: DISCONTINUED | OUTPATIENT
Start: 2025-06-07 | End: 2025-06-07

## 2025-06-07 RX ORDER — ACETAMINOPHEN 500 MG/5ML
975 LIQUID (ML) ORAL
Refills: 0 | Status: DISCONTINUED | OUTPATIENT
Start: 2025-06-07 | End: 2025-06-12

## 2025-06-07 RX ORDER — DIPHENHYDRAMINE HCL 12.5MG/5ML
25 ELIXIR ORAL EVERY 6 HOURS
Refills: 0 | Status: DISCONTINUED | OUTPATIENT
Start: 2025-06-07 | End: 2025-06-12

## 2025-06-07 RX ORDER — PRAMOXINE HCL 1 %
1 GEL (GRAM) TOPICAL EVERY 4 HOURS
Refills: 0 | Status: DISCONTINUED | OUTPATIENT
Start: 2025-06-07 | End: 2025-06-12

## 2025-06-07 RX ORDER — IBUPROFEN 200 MG
600 TABLET ORAL EVERY 6 HOURS
Refills: 0 | Status: DISCONTINUED | OUTPATIENT
Start: 2025-06-07 | End: 2025-06-12

## 2025-06-07 RX ORDER — MAGNESIUM HYDROXIDE 400 MG/5ML
30 SUSPENSION ORAL
Refills: 0 | Status: DISCONTINUED | OUTPATIENT
Start: 2025-06-07 | End: 2025-06-12

## 2025-06-07 RX ORDER — SIMETHICONE 80 MG
80 TABLET,CHEWABLE ORAL EVERY 4 HOURS
Refills: 0 | Status: DISCONTINUED | OUTPATIENT
Start: 2025-06-07 | End: 2025-06-12

## 2025-06-07 RX ORDER — OXYTOCIN-SODIUM CHLORIDE 0.9% IV SOLN 30 UNIT/500ML 30-0.9/5 UT/ML-%
10 SOLUTION INTRAVENOUS ONCE
Refills: 0 | Status: COMPLETED | OUTPATIENT
Start: 2025-06-07 | End: 2025-06-07

## 2025-06-07 RX ORDER — DIBUCAINE 10 MG/G
1 OINTMENT TOPICAL EVERY 6 HOURS
Refills: 0 | Status: DISCONTINUED | OUTPATIENT
Start: 2025-06-07 | End: 2025-06-12

## 2025-06-07 RX ORDER — ACETAMINOPHEN 500 MG/5ML
975 LIQUID (ML) ORAL
Refills: 0 | Status: DISCONTINUED | OUTPATIENT
Start: 2025-06-07 | End: 2025-06-07

## 2025-06-07 RX ORDER — HYDROCORTISONE 10 MG/G
1 CREAM TOPICAL EVERY 6 HOURS
Refills: 0 | Status: DISCONTINUED | OUTPATIENT
Start: 2025-06-07 | End: 2025-06-12

## 2025-06-07 RX ORDER — ACETAMINOPHEN 500 MG/5ML
1000 LIQUID (ML) ORAL ONCE
Refills: 0 | Status: COMPLETED | OUTPATIENT
Start: 2025-06-07 | End: 2025-06-07

## 2025-06-07 RX ORDER — IBUPROFEN 200 MG
600 TABLET ORAL EVERY 6 HOURS
Refills: 0 | Status: COMPLETED | OUTPATIENT
Start: 2025-06-07 | End: 2026-05-06

## 2025-06-07 RX ORDER — KETOROLAC TROMETHAMINE 30 MG/ML
30 INJECTION, SOLUTION INTRAMUSCULAR; INTRAVENOUS ONCE
Refills: 0 | Status: DISCONTINUED | OUTPATIENT
Start: 2025-06-07 | End: 2025-06-07

## 2025-06-07 RX ADMIN — Medication 975 MILLIGRAM(S): at 08:50

## 2025-06-07 RX ADMIN — SODIUM CHLORIDE 50 MILLILITER(S): 9 INJECTION, SOLUTION INTRAVENOUS at 21:23

## 2025-06-07 RX ADMIN — Medication 3 MILLILITER(S): at 22:03

## 2025-06-07 RX ADMIN — Medication 975 MILLIGRAM(S): at 21:23

## 2025-06-07 RX ADMIN — Medication 3 MILLILITER(S): at 05:18

## 2025-06-07 RX ADMIN — Medication 1000 MILLIGRAM(S): at 13:59

## 2025-06-07 RX ADMIN — Medication 600 MILLIGRAM(S): at 11:20

## 2025-06-07 RX ADMIN — Medication 3 MILLILITER(S): at 13:22

## 2025-06-07 RX ADMIN — Medication 10 MILLIGRAM(S): at 13:08

## 2025-06-07 RX ADMIN — OXYTOCIN-SODIUM CHLORIDE 0.9% IV SOLN 30 UNIT/500ML 10 UNIT(S): 30-0.9/5 SOLUTION at 02:54

## 2025-06-07 RX ADMIN — Medication 600 MILLIGRAM(S): at 23:03

## 2025-06-07 RX ADMIN — MAGNESIUM HYDROXIDE 30 MILLILITER(S): 400 SUSPENSION ORAL at 22:00

## 2025-06-07 RX ADMIN — Medication 975 MILLIGRAM(S): at 22:03

## 2025-06-07 RX ADMIN — Medication 25 MILLIGRAM(S): at 13:14

## 2025-06-07 RX ADMIN — Medication 600 MILLIGRAM(S): at 17:24

## 2025-06-07 RX ADMIN — Medication 400 MILLIGRAM(S): at 13:18

## 2025-06-07 RX ADMIN — Medication 50 GM/HR: at 04:42

## 2025-06-07 RX ADMIN — KETOROLAC TROMETHAMINE 30 MILLIGRAM(S): 30 INJECTION, SOLUTION INTRAMUSCULAR; INTRAVENOUS at 03:15

## 2025-06-07 RX ADMIN — Medication 600 MILLIGRAM(S): at 18:00

## 2025-06-07 RX ADMIN — Medication 50 GM/HR: at 19:42

## 2025-06-07 RX ADMIN — Medication 1 TABLET(S): at 13:01

## 2025-06-07 RX ADMIN — Medication 50 GM/HR: at 07:18

## 2025-06-07 RX ADMIN — Medication 600 MILLIGRAM(S): at 22:00

## 2025-06-07 RX ADMIN — Medication 30 MILLIGRAM(S): at 13:25

## 2025-06-07 RX ADMIN — Medication 600 MILLIGRAM(S): at 10:24

## 2025-06-07 RX ADMIN — KETOROLAC TROMETHAMINE 30 MILLIGRAM(S): 30 INJECTION, SOLUTION INTRAMUSCULAR; INTRAVENOUS at 03:45

## 2025-06-07 RX ADMIN — OXYTOCIN-SODIUM CHLORIDE 0.9% IV SOLN 30 UNIT/500ML 167 MILLIUNIT(S)/MIN: 30-0.9/5 SOLUTION at 02:45

## 2025-06-07 RX ADMIN — Medication 975 MILLIGRAM(S): at 07:57

## 2025-06-07 NOTE — OB RN DELIVERY SUMMARY - NSSELHIDDEN_OBGYN_ALL_OB_FT
[NS_DeliveryAttending1_OBGYN_ALL_OB_FT:[id number]],[NS_DeliveryAssist1_OBGYN_ALL_OB_FT:[id number]],[NS_DeliveryAssist2_OBGYN_ALL_OB_FT:[id number]],[NS_DeliveryRN_OBGYN_ALL_OB_FT:[id number]]

## 2025-06-07 NOTE — OB NEONATOLOGY/PEDIATRICIAN DELIVERY SUMMARY - NSPEDSNEONOTESA_OBGYN_ALL_OB_FT
Baby is a 37.3 wk AGA male born to a 39 y/o  mother via . Peds called to delivery for cat ii. Maternal history uncomplicated. Pregnancy complicated by sPEC on Mg, GDMA1 and AMA. Maternal blood type B+. PNL: HIV neg/RPR NR/HBsAg neg/rubella immune. GBS negative on . SROM at 2232 on , clear fluids. Highest maternal temp 36.8. EOS: 0.11. Baby born vigorous and crying spontaneously. Warmed, dried, stimulated. Apgars 8/9. Mom feeding plans unknown and consents hepB. Circ requested.     BW: 2900  : 25  TOB: 0244    Physical Exam:  Gen: NAD, +grimace  HEENT: anterior fontanel open soft and flat, no cleft lip/palate, ears normal set, no ear pits or tags. no lesions in mouth/throat, nares clinically patent  Resp: no increased work of breathing, good air entry b/l, clear to auscultation bilaterally  Cardio: Normal S1/S2, regular rate and rhythm, no murmurs, rubs or gallops  Abd: soft, non tender, non distended, + bowel sounds, umbilical cord with 3 vessels  Neuro: +grasp/suck/geronimo, normal tone  Extremities: negative chacon and ortolani, moving all extremities, full range of motion x 4, no crepitus  Skin: pink, warm  Genitals: normal male anatomy, testicles palpable in scrotum b/l, Russell 1, anus patent

## 2025-06-07 NOTE — OB PROVIDER DELIVERY SUMMARY - NSATTEMPTEDVBAC_OBGYN_ALL_OB
Virtual Telephone Check-In    This visit is conducted using Telemedicine with live, interactive video and audio. [place] in [place]   Patient understands and accepts financial responsibility for any deductible, co-insurance and/or co-pays associated with this service. Telehealth outside of [address] Verbal Consent   I conducted a telehealth visit with Peter Fitzpatrick on 4/27/2022   which was completed using two-way, real-time interactive audio and video  communication. This has been done in good arin to provide continuity of care in the best interest of the provider-patient relationship, due to the COVID -19 public health crisis/national emergency where restrictions of face-to-face office visits are ongoing. Every conscious effort was taken to allow for sufficient and adequate time to complete the visit. The patient was made aware of the limitations of the telehealth visit, including treatment limitations as no physical exam could be performed. The patient was advised to call 911 or to go to the ER in case there was an emergency. The patient was also advised of the potential privacy & security concerns related to the telehealth platform. The patient was made aware of where to find [facility] notice of privacy practices, telehealth consent form and other related consent forms and documents. which are located on the [facility] website. The patient verbally agreed to telehealth consent form, related consents and the risks discussed. Lastly, the patient confirmed that they were in [place]. Included in this visit, time may have been spent reviewing labs, medications, radiology tests and decision making. Appropriate medical decision-making and tests are ordered as detailed in the plan of care above. Coding/billing information is submitted for this visit based on complexity of care and/or time spent for the visit.   Time spent: 10 minutes  HPI: Elida Moe is a 46year old female who is calling about sinusitis/cough. Has been going on for about a week. Checked a covid test and it was negative. Symptoms not improving. Over the counter robitussin helps minimally. No fevers or chills. No chest pain or shortness of breath   ROS:  General: Feels well overall  Skin: Denies any unusual skin lesions  Eyes: Denies blurred vision or double vision  All systems negative, except for above  Physical:  GENERAL: Alert and oriented, well developed, well nourished,in no apparent distress  SKIN: no rashes,no suspicious lesions on face  LUNGS: no audible wheezing  PSYCH: pleasant, appropriate mood and affect    Assessment and Plan  (J01.01) Acute recurrent maxillary sinusitis  (primary encounter diagnosis)  Plan: amoxicillin clavulanate 875-125 MG Oral Tab  Note: continue fluids and will start antibiotics as above.  Follow up in 3 to 5 days or sooner if symptoms get worse   [address] [place] [address] No

## 2025-06-07 NOTE — OB RN DELIVERY SUMMARY - NS_SEPSISRSKCALC_OBGYN_ALL_OB_FT
EOS calculated successfully. EOS Risk Factor: 0.5/1000 live births ([facility] national incidence); GA=37w3d; Temp=98.6; ROM=3.2; GBS='Negative'; Antibiotics='No antibiotics or any antibiotics < 2 hrs prior to birth'

## 2025-06-07 NOTE — OB PROVIDER DELIVERY SUMMARY - NSPROVIDERDELIVERYNOTE_OBGYN_ALL_OB_FT
Patient was found to be fully dilated and directed to push with contractions.  Spontaneous vaginal delivery of liveborn male infant.  Head, shoulders and body delivered easily. Loose nuchal x1, easily reduced prior to delivery.  Infant was passed to mother.  Cord clamping was delayed 1 minute. Cord was cut.  Placenta delivered spontaneously intact. Uterine massage was performed and pitocin was given.  Vaginal walls, sulci, and cervix examined. No sulcal or cervical lacerations noted. Second degree laceration repaired with chromic in the usual fashion.  Fundus was firm.   Good hemostasis was noted.  Lap pad and needle count correct x2.     Ting Moon PGY1  Delivery with Dr. Quinn, attending and Dr. Cantu, PGY4

## 2025-06-07 NOTE — OB PROVIDER DELIVERY SUMMARY - NSSELHIDDEN_OBGYN_ALL_OB_FT
[NS_DeliveryAttending1_OBGYN_ALL_OB_FT:[id number]],[NS_DeliveryAssist1_OBGYN_ALL_OB_FT:[id number]],[NS_DeliveryAssist2_OBGYN_ALL_OB_FT:[id number]=]

## 2025-06-07 NOTE — OB PROVIDER DELIVERY SUMMARY - NSLOWPPHRISK_OBGYN_A_OB
No previous uterine incision/[place] Pregnancy/Less than or equal to 4 previous vaginal births/No known bleeding disorder/No history of postpartum hemorrhage

## 2025-06-08 LAB
ALBUMIN SERPL ELPH-MCNC: 2.9 G/DL — LOW (ref 3.3–5)
ALP SERPL-CCNC: 69 U/L — SIGNIFICANT CHANGE UP (ref 40–120)
ALT FLD-CCNC: 13 U/L — SIGNIFICANT CHANGE UP (ref 4–33)
ANION GAP SERPL CALC-SCNC: 12 MMOL/L — SIGNIFICANT CHANGE UP (ref 7–14)
AST SERPL-CCNC: 16 U/L — SIGNIFICANT CHANGE UP (ref 4–32)
BASOPHILS # BLD AUTO: 0.02 K/UL — SIGNIFICANT CHANGE UP (ref 0–0.2)
BASOPHILS NFR BLD AUTO: 0.2 % — SIGNIFICANT CHANGE UP (ref 0–2)
BILIRUB SERPL-MCNC: <0.2 MG/DL — SIGNIFICANT CHANGE UP (ref 0.2–1.2)
BUN SERPL-MCNC: 7 MG/DL — SIGNIFICANT CHANGE UP (ref 7–23)
CALCIUM SERPL-MCNC: 6.5 MG/DL — CRITICAL LOW (ref 8.4–10.5)
CHLORIDE SERPL-SCNC: 103 MMOL/L — SIGNIFICANT CHANGE UP (ref 98–107)
CO2 SERPL-SCNC: 21 MMOL/L — LOW (ref 22–31)
CREAT SERPL-MCNC: 0.53 MG/DL — SIGNIFICANT CHANGE UP (ref 0.5–1.3)
EGFR: 119 ML/MIN/1.73M2 — SIGNIFICANT CHANGE UP
EGFR: 119 ML/MIN/1.73M2 — SIGNIFICANT CHANGE UP
EOSINOPHIL # BLD AUTO: 0.03 K/UL — SIGNIFICANT CHANGE UP (ref 0–0.5)
EOSINOPHIL NFR BLD AUTO: 0.2 % — SIGNIFICANT CHANGE UP (ref 0–6)
GLUCOSE SERPL-MCNC: 93 MG/DL — SIGNIFICANT CHANGE UP (ref 70–99)
HCT VFR BLD CALC: 25.5 % — LOW (ref 34.5–45)
HGB BLD-MCNC: 8.6 G/DL — LOW (ref 11.5–15.5)
IANC: 10.74 K/UL — HIGH (ref 1.8–7.4)
IMM GRANULOCYTES NFR BLD AUTO: 0.6 % — SIGNIFICANT CHANGE UP (ref 0–0.9)
LYMPHOCYTES # BLD AUTO: 1.81 K/UL — SIGNIFICANT CHANGE UP (ref 1–3.3)
LYMPHOCYTES # BLD AUTO: 13.7 % — SIGNIFICANT CHANGE UP (ref 13–44)
MCHC RBC-ENTMCNC: 27.2 PG — SIGNIFICANT CHANGE UP (ref 27–34)
MCHC RBC-ENTMCNC: 33.7 G/DL — SIGNIFICANT CHANGE UP (ref 32–36)
MCV RBC AUTO: 80.7 FL — SIGNIFICANT CHANGE UP (ref 80–100)
MONOCYTES # BLD AUTO: 0.53 K/UL — SIGNIFICANT CHANGE UP (ref 0–0.9)
MONOCYTES NFR BLD AUTO: 4 % — SIGNIFICANT CHANGE UP (ref 2–14)
NEUTROPHILS # BLD AUTO: 10.74 K/UL — HIGH (ref 1.8–7.4)
NEUTROPHILS NFR BLD AUTO: 81.3 % — HIGH (ref 43–77)
NRBC # BLD AUTO: 0 K/UL — SIGNIFICANT CHANGE UP (ref 0–0)
NRBC # FLD: 0 K/UL — SIGNIFICANT CHANGE UP (ref 0–0)
NRBC BLD AUTO-RTO: 0 /100 WBCS — SIGNIFICANT CHANGE UP (ref 0–0)
PLATELET # BLD AUTO: 286 K/UL — SIGNIFICANT CHANGE UP (ref 150–400)
POTASSIUM SERPL-MCNC: 4.1 MMOL/L — SIGNIFICANT CHANGE UP (ref 3.5–5.3)
POTASSIUM SERPL-SCNC: 4.1 MMOL/L — SIGNIFICANT CHANGE UP (ref 3.5–5.3)
PROT SERPL-MCNC: 5.6 G/DL — LOW (ref 6–8.3)
RBC # BLD: 3.16 M/UL — LOW (ref 3.8–5.2)
RBC # FLD: 13.2 % — SIGNIFICANT CHANGE UP (ref 10.3–14.5)
SODIUM SERPL-SCNC: 136 MMOL/L — SIGNIFICANT CHANGE UP (ref 135–145)
WBC # BLD: 13.21 K/UL — HIGH (ref 3.8–10.5)
WBC # FLD AUTO: 13.21 K/UL — HIGH (ref 3.8–10.5)

## 2025-06-08 RX ORDER — SENNA 187 MG
2 TABLET ORAL AT BEDTIME
Refills: 0 | Status: DISCONTINUED | OUTPATIENT
Start: 2025-06-08 | End: 2025-06-12

## 2025-06-08 RX ORDER — FERROUS SULFATE 137(45) MG
325 TABLET, EXTENDED RELEASE ORAL
Refills: 0 | Status: DISCONTINUED | OUTPATIENT
Start: 2025-06-08 | End: 2025-06-12

## 2025-06-08 RX ADMIN — Medication 3 MILLILITER(S): at 22:40

## 2025-06-08 RX ADMIN — MAGNESIUM HYDROXIDE 30 MILLILITER(S): 400 SUSPENSION ORAL at 09:34

## 2025-06-08 RX ADMIN — Medication 975 MILLIGRAM(S): at 15:06

## 2025-06-08 RX ADMIN — Medication 1 TABLET(S): at 11:43

## 2025-06-08 RX ADMIN — Medication 975 MILLIGRAM(S): at 08:05

## 2025-06-08 RX ADMIN — Medication 325 MILLIGRAM(S): at 17:59

## 2025-06-08 RX ADMIN — MAGNESIUM HYDROXIDE 30 MILLILITER(S): 400 SUSPENSION ORAL at 21:35

## 2025-06-08 RX ADMIN — Medication 600 MILLIGRAM(S): at 04:14

## 2025-06-08 RX ADMIN — Medication 600 MILLIGRAM(S): at 12:40

## 2025-06-08 RX ADMIN — Medication 600 MILLIGRAM(S): at 11:43

## 2025-06-08 RX ADMIN — Medication 600 MILLIGRAM(S): at 03:57

## 2025-06-08 RX ADMIN — Medication 975 MILLIGRAM(S): at 22:38

## 2025-06-08 RX ADMIN — Medication 3 MILLILITER(S): at 11:02

## 2025-06-08 RX ADMIN — Medication 600 MILLIGRAM(S): at 23:06

## 2025-06-08 RX ADMIN — Medication 500 MILLIGRAM(S): at 14:32

## 2025-06-08 RX ADMIN — Medication 975 MILLIGRAM(S): at 09:00

## 2025-06-08 RX ADMIN — Medication 30 MILLIGRAM(S): at 14:32

## 2025-06-08 RX ADMIN — Medication 600 MILLIGRAM(S): at 17:59

## 2025-06-08 RX ADMIN — Medication 975 MILLIGRAM(S): at 14:32

## 2025-06-08 RX ADMIN — Medication 80 MILLIGRAM(S): at 08:06

## 2025-06-08 RX ADMIN — Medication 975 MILLIGRAM(S): at 21:35

## 2025-06-08 RX ADMIN — Medication 3 MILLILITER(S): at 22:39

## 2025-06-08 RX ADMIN — Medication 600 MILLIGRAM(S): at 18:39

## 2025-06-08 RX ADMIN — Medication 3 MILLILITER(S): at 11:01

## 2025-06-09 ENCOUNTER — TRANSCRIPTION ENCOUNTER (OUTPATIENT)
Age: 40
End: 2025-06-09

## 2025-06-09 RX ORDER — NIFEDIPINE 30 MG
1 TABLET, EXTENDED RELEASE 24 HR ORAL
Qty: 30 | Refills: 1
Start: 2025-06-09 | End: 2025-08-07

## 2025-06-09 RX ORDER — METOCLOPRAMIDE HCL 10 MG
TABLET ORAL
Refills: 0 | DISCHARGE

## 2025-06-09 RX ORDER — ACETAMINOPHEN 500 MG/5ML
3 LIQUID (ML) ORAL
Qty: 0 | Refills: 0 | DISCHARGE
Start: 2025-06-09

## 2025-06-09 RX ORDER — DIPHENHYDRAMINE HCL 12.5MG/5ML
ELIXIR ORAL
Refills: 0 | DISCHARGE

## 2025-06-09 RX ORDER — NIFEDIPINE 30 MG
1 TABLET, EXTENDED RELEASE 24 HR ORAL
Qty: 1 | Refills: 0
Start: 2025-06-09

## 2025-06-09 RX ORDER — DIPHENHYDRAMINE HCL 12.5MG/5ML
25 ELIXIR ORAL EVERY 4 HOURS
Refills: 0 | Status: DISCONTINUED | OUTPATIENT
Start: 2025-06-09 | End: 2025-06-12

## 2025-06-09 RX ORDER — IBUPROFEN 200 MG
1 TABLET ORAL
Qty: 0 | Refills: 0 | DISCHARGE
Start: 2025-06-09

## 2025-06-09 RX ORDER — NIFEDIPINE 30 MG
30 TABLET, EXTENDED RELEASE 24 HR ORAL ONCE
Refills: 0 | Status: COMPLETED | OUTPATIENT
Start: 2025-06-09 | End: 2025-06-09

## 2025-06-09 RX ADMIN — Medication 325 MILLIGRAM(S): at 05:45

## 2025-06-09 RX ADMIN — Medication 600 MILLIGRAM(S): at 11:52

## 2025-06-09 RX ADMIN — Medication 600 MILLIGRAM(S): at 00:13

## 2025-06-09 RX ADMIN — Medication 3 MILLILITER(S): at 14:30

## 2025-06-09 RX ADMIN — Medication 25 MILLIGRAM(S): at 18:21

## 2025-06-09 RX ADMIN — Medication 30 MILLIGRAM(S): at 14:26

## 2025-06-09 RX ADMIN — Medication 600 MILLIGRAM(S): at 07:01

## 2025-06-09 RX ADMIN — Medication 600 MILLIGRAM(S): at 12:20

## 2025-06-09 RX ADMIN — Medication 975 MILLIGRAM(S): at 09:30

## 2025-06-09 RX ADMIN — Medication 975 MILLIGRAM(S): at 09:00

## 2025-06-09 RX ADMIN — Medication 325 MILLIGRAM(S): at 18:21

## 2025-06-09 RX ADMIN — Medication 500 MILLIGRAM(S): at 11:53

## 2025-06-09 RX ADMIN — Medication 600 MILLIGRAM(S): at 05:45

## 2025-06-09 RX ADMIN — Medication 3 MILLILITER(S): at 07:02

## 2025-06-09 RX ADMIN — MAGNESIUM HYDROXIDE 30 MILLILITER(S): 400 SUSPENSION ORAL at 11:53

## 2025-06-09 RX ADMIN — Medication 1 TABLET(S): at 11:53

## 2025-06-09 RX ADMIN — Medication 30 MILLIGRAM(S): at 18:21

## 2025-06-09 NOTE — DISCHARGE NOTE OB - CARE PROVIDER_API CALL
Carmenza Mendenhall  Obstetrics and Gynecology  [address], [address]  [place] [address]  Phone: [phone]  Fax: [phone]  Follow Up Time: Routine   Carmenza Mendenhall  Obstetrics and Gynecology  [address], [address]  [place] [address]  Phone: [phone]  Fax: [phone]  Follow Up Time: Routine    Akanksha Spencer  Cardiovascular Disease  [address] [place] [address], [address]  [place] [address]  Phone: [phone]  Fax: [phone]  Follow Up Time: 1 week

## 2025-06-09 NOTE — DISCHARGE NOTE OB - CARE PLAN
1 Principal Discharge DX:	Spontaneous vaginal delivery  Assessment and plan of treatment:	After discharge, please stay on pelvic rest for 6 weeks, meaning no sexual intercourse, no tampons and no douching.  No driving for 2 weeks as women can loose a lot of blood during delivery and there is a possibility of being lightheaded/fainting.  No lifting objects heavier than baby for two weeks.  Expect to have vaginal bleeding/spotting for up to six weeks.  The bleeding should get lighter and more white/light brown with time.  For bleeding soaking more than a pad an hour or passing clots greater than the size of your fist, come in to the emergency department.    Follow up in clinic in 6 weeks.  Secondary Diagnosis:	Severe preeclampsia, third trimester  Assessment and plan of treatment:	If you had high blood pressure during your pregnancy, during labor, or after delivery, please follow up with your OB/Gyn in 2 days for a BP check.    Continue to monitor your blood pressures three times daily at home (before breakfast, lunch, and dinner).  If any blood pressures are GREATER than 160/110, or if you experience changes in your vision, headache not improved with tylenol, persistent nausea, vomiting and/or right upper abdominal pain, present to ED for further evaluation.    If you are taking blood pressure medication, take your blood pressure before taking your medication. If LESS [id number] systolic (top number) and/or LESS than 60 diastolic (bottom number), do not take your medication.

## 2025-06-09 NOTE — DISCHARGE NOTE OB - MEDICATION SUMMARY - MEDICATIONS TO CHANGE
Called patient today about needing to reschedule December appointment. She started shouting that no one cares about her as no one called her about labs, let her know I would research that.     Reviewed lab notes - stable, concern for cholesterol. Patient did not care about that. She is concerned with her abnormal CBC, reviewed her ranges. She is wondering what her red dots are from and she is having L knee pain again.     Let patient know I would route concern to provider for follow up.      I will SWITCH the dose or number of times a day I take the medications listed below when I get home from the hospital:  None

## 2025-06-09 NOTE — DISCHARGE NOTE OB - MEDICATION SUMMARY - MEDICATIONS TO STOP TAKING
I will STOP taking the medications listed below when I get home from the hospital:    cephalexin 500 mg oral tablet  -- 1 tab(s) by mouth 2 times a day   I will STOP taking the medications listed below when I get home from the hospital:    cephalexin 500 mg oral tablet  -- 1 tab(s) by mouth 2 times a day    Colace 100 mg oral capsule  -- 1 cap(s) by mouth 2 times a day   -- Medication should be taken with plenty of water.    doxylamine-pyridoxine 10 mg-10 mg oral delayed release tablet  -- 1 tab(s) by mouth once a day take two tabs at night, one tab in the morning and one tab in the afternoon.    reglan    zofran    benadryl

## 2025-06-09 NOTE — DISCHARGE NOTE OB - CARE PROVIDERS DIRECT ADDRESSES
,DirectAddress_Unknown ,DirectAddress_Unknown,[email or web address]

## 2025-06-09 NOTE — DISCHARGE NOTE OB - HOSPITAL COURSE
Patient had an  c/b sPEC. Patient s/p Magnesium therapy for seizure prophylaxis. Patient started on Procardia 30XL for BP control and prescription sent to pharmacy. HELLP labs within normal limits, P/C ratio 0.3. On postpartum day 2, patient was discharged home in stable condition, voiding spontaneously and with normal vital signs.  Patient had an  c/b sPEC. Patient s/p Magnesium therapy for seizure prophylaxis. Patient started on Procardia 30XL for BP control. Procardia increased to 60mg and prescription sent to pharmacy. HELLP labs within normal limits, P/C ratio 0.3. On postpartum day 2, patient was discharged home in stable condition, voiding spontaneously and with normal vital signs.  Patient had an  c/b sPEC. Patient s/p Magnesium therapy for seizure prophylaxis. Patient noted to be hypertensive to 140-150s/90s PP. Antihypertensive regimen modified. Patient seen by cardio OB on PPD#4 who recommended patient be started on Labetalol 300TID and Procardia 30 XL QD.  BP well controlled on this regimen. Patient to follow up with cardio OB outpatient. On postpartum day 5, patient was discharged home in stable condition, voiding spontaneously and with normal vital signs.     Patient to f/u with Cardiologist Dr. Spencer.

## 2025-06-09 NOTE — DISCHARGE NOTE OB - PLAN OF CARE
After discharge, please stay on pelvic rest for 6 weeks, meaning no sexual intercourse, no tampons and no douching.  No driving for 2 weeks as women can loose a lot of blood during delivery and there is a possibility of being lightheaded/fainting.  No lifting objects heavier than baby for two weeks.  Expect to have vaginal bleeding/spotting for up to six weeks.  The bleeding should get lighter and more white/light brown with time.  For bleeding soaking more than a pad an hour or passing clots greater than the size of your fist, come in to the emergency department.    Follow up in clinic in 6 weeks. If you had high blood pressure during your pregnancy, during labor, or after delivery, please follow up with your OB/Gyn in 2 days for a BP check.    Continue to monitor your blood pressures three times daily at home (before breakfast, lunch, and dinner).  If any blood pressures are GREATER than 160/110, or if you experience changes in your vision, headache not improved with tylenol, persistent nausea, vomiting and/or right upper abdominal pain, present to ED for further evaluation.    If you are taking blood pressure medication, take your blood pressure before taking your medication. If LESS [id number] systolic (top number) and/or LESS than 60 diastolic (bottom number), do not take your medication.

## 2025-06-09 NOTE — PROGRESS NOTE ADULT - ATTENDING COMMENTS
OBGYN Attending    Pt seen and examined at bedside.  Agree with above.  Doing well PPD#2.  Pain controlled.  Ambulating.  Tolerating PO.      Abdomen benign and non tender   Minimal lochia   Ext NT b/l    A/P: PPD#2.  Course c/b sPEC.  BP currently in goal range on Procardia 30 XL.  Doing Well.    Routine PP care.  Discharge planning.    BETZY Arzola MD

## 2025-06-09 NOTE — DISCHARGE NOTE OB - PROVIDER TOKENS
PROVIDER:[TOKEN:[[id number]],FOLLOWUP:[Routine]] PROVIDER:[TOKEN:[[id number]],FOLLOWUP:[Routine]],PROVIDER:[TOKEN:[[id number]],FOLLOWUP:[1 week]]

## 2025-06-09 NOTE — DISCHARGE NOTE OB - MEDICATION SUMMARY - MEDICATIONS TO TAKE
I will START or STAY ON the medications listed below when I get home from the hospital:    Electronic Blood pressure cuff  -- If you had high blood pressure during your pregnancy, during labor, or after delivery, please follow up with your OB/Gyn in 2 days for a BP check.    Continue to monitor your blood pressures three times daily at home (before breakfast, lunch, and dinner).  If any blood pressures are GREATER than 160/110, or if you experience changes in your vision, headache not improved with tylenol, persistent nausea, vomiting and/or right upper abdominal pain, present to ED for further evaluation.    If you are taking blood pressure medication, take your blood pressure before taking your medication. If LESS [id number] systolic (top number) and/or LESS than 60 diastolic (bottom number), do not take your medication.  -- Indication: For blood pressure monitor     ibuprofen 600 mg oral tablet  -- 1 tab(s) by mouth every 6 hours  -- Indication: For pain    acetaminophen 325 mg oral tablet  -- 3 tab(s) by mouth every 6 hours  -- Indication: For pain    Procardia XL 30 mg oral tablet, extended release  -- 1 tab(s) by mouth once a day If you had high blood pressure during your pregnancy, during labor, or after delivery, please follow up with your OB/Gyn in 2 days for a BP check.    Continue to monitor your blood pressures three times daily at home (before breakfast, lunch, and dinner).  If any blood pressures are GREATER than 160/110, or if you experience changes in your vision, headache not improved with tylenol, persistent nausea, vomiting and/or right upper abdominal pain, present to ED for further evaluation.    If you are taking blood pressure medication, take your blood pressure before taking your medication. If LESS [id number] systolic (top number) and/or LESS than 60 diastolic (bottom number), do not take your medication.  -- Indication: For blood pressure medication   I will START or STAY ON the medications listed below when I get home from the hospital:    Electronic Blood pressure cuff  -- If you had high blood pressure during your pregnancy, during labor, or after delivery, please follow up with your OB/Gyn in 2 days for a BP check.    Continue to monitor your blood pressures three times daily at home (before breakfast, lunch, and dinner).  If any blood pressures are GREATER than 160/110, or if you experience changes in your vision, headache not improved with tylenol, persistent nausea, vomiting and/or right upper abdominal pain, present to ED for further evaluation.    If you are taking blood pressure medication, take your blood pressure before taking your medication. If LESS [id number] systolic (top number) and/or LESS than 60 diastolic (bottom number), do not take your medication.  -- Indication: For blood pressure monitor     ibuprofen 600 mg oral tablet  -- 1 tab(s) by mouth every 6 hours  -- Indication: For pain    acetaminophen 325 mg oral tablet  -- 3 tab(s) by mouth every 6 hours  -- Indication: For pain    Procardia XL 30 mg oral tablet, extended release  -- 1 tab(s) by mouth once a day Take one dose of Procardia 30XL today 6/9 at 6pm  -- Indication: For Take this one time today at 6pm!    Procardia XL 60 mg oral tablet, extended release  -- 1 tab(s) by mouth once a day If you had high blood pressure during your pregnancy, during labor, or after delivery, please follow up with your OB/Gyn in 2 days for a BP check.    Continue to monitor your blood pressures three times daily at home (before breakfast, lunch, and dinner).  If any blood pressures are GREATER than 160/110, or if you experience changes in your vision, headache not improved with tylenol, persistent nausea, vomiting and/or right upper abdominal pain, present to ED for further evaluation.    If you are taking blood pressure medication, take your blood pressure before taking your medication. If LESS [id number] systolic (top number) and/or LESS than 60 diastolic (bottom number), do not take your medication.  -- Indication: For Start Procardia 60XL daily on 6/10   I will START or STAY ON the medications listed below when I get home from the hospital:    Electronic Blood pressure cuff  -- If you had high blood pressure during your pregnancy, during labor, or after delivery, please follow up with your OB/Gyn in 2 days for a BP check.    Continue to monitor your blood pressures three times daily at home (before breakfast, lunch, and dinner).  If any blood pressures are GREATER than 160/110, or if you experience changes in your vision, headache not improved with tylenol, persistent nausea, vomiting and/or right upper abdominal pain, present to ED for further evaluation.    If you are taking blood pressure medication, take your blood pressure before taking your medication. If LESS [id number] systolic (top number) and/or LESS than 60 diastolic (bottom number), do not take your medication.  -- Indication: For blood pressure monitor     ibuprofen 600 mg oral tablet  -- 1 tab(s) by mouth every 6 hours  -- Indication: For pain    acetaminophen 325 mg oral tablet  -- 3 tab(s) by mouth every 6 hours  -- Indication: For pain    labetalol 300 mg oral tablet  -- 1 tab(s) by mouth 3 times a day If you had high blood pressure during your pregnancy, during labor, or after delivery, please follow up with your OB/Gyn in 2 days for a BP check.    Continue to monitor your blood pressures three times daily at home (before breakfast, lunch, and dinner).  If any blood pressures are GREATER than 160/110, or if you experience changes in your vision, headache not improved with tylenol, persistent nausea, vomiting and/or right upper abdominal pain, present to ED for further evaluation.    If you are taking blood pressure medication, take your blood pressure before taking your medication. If LESS [id number] systolic (top number) and/or LESS than 60 diastolic (bottom number), do not take your medication.  -- Indication: For blood pressure medication

## 2025-06-09 NOTE — DISCHARGE NOTE OB - FINANCIAL ASSISTANCE
[facility] provides services at a reduced cost to those who are determined to be eligible through [facility]’s financial assistance program. Information regarding [facility]’s financial assistance program can be found by going to [email or web address] or by calling [phone].

## 2025-06-09 NOTE — DISCHARGE NOTE OB - PATIENT PORTAL LINK FT
You can access the FollowMyHealth Patient Portal offered by [facility] by registering at the following website: [email or web address]. By joining [employer]’s FollowMyHealth portal, you will also be able to view your health information using other applications (apps) compatible with our system.

## 2025-06-09 NOTE — PROVIDER CONTACT NOTE (OTHER) - ASSESSMENT
Patient denies any headache, epigastric pain or visual disturbances. pt tolerating pain management well.    BP = 156/87 74bpm  repeat BP = 146/92 81bpm Patient denies any headache, epigastric pain or visual disturbances. pt tolerating pain management well. pt tolerating pain management well throughout shift.    BP = 156/87 74bpm  repeat BP = 146/92 81bpm

## 2025-06-10 PROCEDURE — 93010 ELECTROCARDIOGRAM REPORT: CPT

## 2025-06-10 RX ORDER — NIFEDIPINE 30 MG
90 TABLET, EXTENDED RELEASE 24 HR ORAL DAILY
Refills: 0 | Status: DISCONTINUED | OUTPATIENT
Start: 2025-06-10 | End: 2025-06-11

## 2025-06-10 RX ORDER — METOCLOPRAMIDE HCL 10 MG
10 TABLET ORAL ONCE
Refills: 0 | Status: COMPLETED | OUTPATIENT
Start: 2025-06-10 | End: 2025-06-10

## 2025-06-10 RX ORDER — NIFEDIPINE 30 MG
20 TABLET, EXTENDED RELEASE 24 HR ORAL ONCE
Refills: 0 | Status: COMPLETED | OUTPATIENT
Start: 2025-06-10 | End: 2025-06-10

## 2025-06-10 RX ORDER — NIFEDIPINE 30 MG
60 TABLET, EXTENDED RELEASE 24 HR ORAL DAILY
Refills: 0 | Status: DISCONTINUED | OUTPATIENT
Start: 2025-06-10 | End: 2025-06-10

## 2025-06-10 RX ADMIN — Medication 20 MILLIGRAM(S): at 09:01

## 2025-06-10 RX ADMIN — Medication 500 MILLIGRAM(S): at 12:25

## 2025-06-10 RX ADMIN — Medication 600 MILLIGRAM(S): at 06:01

## 2025-06-10 RX ADMIN — Medication 600 MILLIGRAM(S): at 05:31

## 2025-06-10 RX ADMIN — Medication 10 MILLIGRAM(S): at 12:24

## 2025-06-10 RX ADMIN — Medication 1 TABLET(S): at 12:25

## 2025-06-10 RX ADMIN — Medication 975 MILLIGRAM(S): at 13:00

## 2025-06-10 RX ADMIN — Medication 600 MILLIGRAM(S): at 14:05

## 2025-06-10 RX ADMIN — Medication 25 MILLIGRAM(S): at 12:24

## 2025-06-10 RX ADMIN — Medication 600 MILLIGRAM(S): at 14:30

## 2025-06-10 RX ADMIN — Medication 975 MILLIGRAM(S): at 12:25

## 2025-06-10 RX ADMIN — Medication 90 MILLIGRAM(S): at 14:05

## 2025-06-11 LAB
ALBUMIN SERPL ELPH-MCNC: 3.6 G/DL — SIGNIFICANT CHANGE UP (ref 3.3–5)
ALP SERPL-CCNC: 90 U/L — SIGNIFICANT CHANGE UP (ref 40–120)
ALT FLD-CCNC: 108 U/L — HIGH (ref 4–33)
ANION GAP SERPL CALC-SCNC: 12 MMOL/L — SIGNIFICANT CHANGE UP (ref 7–14)
AST SERPL-CCNC: 77 U/L — HIGH (ref 4–32)
BILIRUB SERPL-MCNC: 0.3 MG/DL — SIGNIFICANT CHANGE UP (ref 0.2–1.2)
BUN SERPL-MCNC: 11 MG/DL — SIGNIFICANT CHANGE UP (ref 7–23)
CALCIUM SERPL-MCNC: 9 MG/DL — SIGNIFICANT CHANGE UP (ref 8.4–10.5)
CHLORIDE SERPL-SCNC: 100 MMOL/L — SIGNIFICANT CHANGE UP (ref 98–107)
CO2 SERPL-SCNC: 23 MMOL/L — SIGNIFICANT CHANGE UP (ref 22–31)
CREAT SERPL-MCNC: 0.59 MG/DL — SIGNIFICANT CHANGE UP (ref 0.5–1.3)
EGFR: 116 ML/MIN/1.73M2 — SIGNIFICANT CHANGE UP
EGFR: 116 ML/MIN/1.73M2 — SIGNIFICANT CHANGE UP
GLUCOSE SERPL-MCNC: 85 MG/DL — SIGNIFICANT CHANGE UP (ref 70–99)
HCT VFR BLD CALC: 32.2 % — LOW (ref 34.5–45)
HGB BLD-MCNC: 10.2 G/DL — LOW (ref 11.5–15.5)
MCHC RBC-ENTMCNC: 27.1 PG — SIGNIFICANT CHANGE UP (ref 27–34)
MCHC RBC-ENTMCNC: 31.7 G/DL — LOW (ref 32–36)
MCV RBC AUTO: 85.4 FL — SIGNIFICANT CHANGE UP (ref 80–100)
NRBC # BLD AUTO: 0 K/UL — SIGNIFICANT CHANGE UP (ref 0–0)
NRBC # FLD: 0 K/UL — SIGNIFICANT CHANGE UP (ref 0–0)
NRBC BLD AUTO-RTO: 0 /100 WBCS — SIGNIFICANT CHANGE UP (ref 0–0)
PLATELET # BLD AUTO: 415 K/UL — HIGH (ref 150–400)
POTASSIUM SERPL-MCNC: 4.4 MMOL/L — SIGNIFICANT CHANGE UP (ref 3.5–5.3)
POTASSIUM SERPL-SCNC: 4.4 MMOL/L — SIGNIFICANT CHANGE UP (ref 3.5–5.3)
PROT SERPL-MCNC: 7.1 G/DL — SIGNIFICANT CHANGE UP (ref 6–8.3)
RBC # BLD: 3.77 M/UL — LOW (ref 3.8–5.2)
RBC # FLD: 13.5 % — SIGNIFICANT CHANGE UP (ref 10.3–14.5)
SODIUM SERPL-SCNC: 135 MMOL/L — SIGNIFICANT CHANGE UP (ref 135–145)
WBC # BLD: 11.42 K/UL — HIGH (ref 3.8–10.5)
WBC # FLD AUTO: 11.42 K/UL — HIGH (ref 3.8–10.5)

## 2025-06-11 PROCEDURE — 99222 1ST HOSP IP/OBS MODERATE 55: CPT

## 2025-06-11 RX ORDER — NIFEDIPINE 30 MG
30 TABLET, EXTENDED RELEASE 24 HR ORAL DAILY
Refills: 0 | Status: DISCONTINUED | OUTPATIENT
Start: 2025-06-12 | End: 2025-06-12

## 2025-06-11 RX ORDER — LABETALOL HYDROCHLORIDE 200 MG/1
300 TABLET, FILM COATED ORAL THREE TIMES A DAY
Refills: 0 | Status: DISCONTINUED | OUTPATIENT
Start: 2025-06-11 | End: 2025-06-12

## 2025-06-11 RX ORDER — LABETALOL HYDROCHLORIDE 200 MG/1
300 TABLET, FILM COATED ORAL THREE TIMES A DAY
Refills: 0 | Status: DISCONTINUED | OUTPATIENT
Start: 2025-06-11 | End: 2025-06-11

## 2025-06-11 RX ADMIN — Medication 500 MILLIGRAM(S): at 12:09

## 2025-06-11 RX ADMIN — Medication 325 MILLIGRAM(S): at 06:18

## 2025-06-11 RX ADMIN — Medication 600 MILLIGRAM(S): at 00:39

## 2025-06-11 RX ADMIN — Medication 975 MILLIGRAM(S): at 08:34

## 2025-06-11 RX ADMIN — Medication 975 MILLIGRAM(S): at 18:07

## 2025-06-11 RX ADMIN — Medication 975 MILLIGRAM(S): at 08:06

## 2025-06-11 RX ADMIN — LABETALOL HYDROCHLORIDE 300 MILLIGRAM(S): 200 TABLET, FILM COATED ORAL at 22:12

## 2025-06-11 RX ADMIN — Medication 90 MILLIGRAM(S): at 14:15

## 2025-06-11 RX ADMIN — Medication 325 MILLIGRAM(S): at 18:07

## 2025-06-11 RX ADMIN — Medication 3 MILLILITER(S): at 22:31

## 2025-06-11 RX ADMIN — Medication 975 MILLIGRAM(S): at 23:44

## 2025-06-11 RX ADMIN — Medication 1 TABLET(S): at 12:09

## 2025-06-11 RX ADMIN — Medication 600 MILLIGRAM(S): at 01:15

## 2025-06-11 RX ADMIN — Medication 975 MILLIGRAM(S): at 18:44

## 2025-06-11 NOTE — CONSULT NOTE ADULT - SUBJECTIVE AND OBJECTIVE BOX
41 yo F  PPD#4 s/p  c/b sPEC. Patient noted to have intermittent tachycardia. Procardia dose being titrated up over the past forty eight hours. She does admit to headache which has been there prior to delivery as well as a "drained" feeling when her HR is high.       O:  T(C): 36.7 (25 @ 17:30), Max: 37.1 (25 @ 10:40)  HR: 100 (25 @ 17:30) (100 - 118)  BP: 140/80 (25 @ 17:30) (134/87 - 153/86)  RR: 19 (25 @ 17:30) (17 - 19)  SpO2: 100% (25 @ 17:30) (96% - 100%)    O/E:  Gen: NAD  HEENT: EOMI  CV: RRR, normal S1 + S2, no m/r/g  Lungs: CTAB  Abd: soft, non-tender  Ext: No edema    Labs:                        10.2   11.42 )-----------( 415      ( 2025 06:00 )             32.2     06-    135  |  100  |  11  ----------------------------<  85  4.4   |  23  |  0.59    Ca    9.0      2025 08:20    TPro  7.1  /  Alb  3.6  /  TBili  0.3  /  DBili  x   /  AST  77[H]  /  ALT  108[H]  /  AlkPhos  90                Meds:  MEDICATIONS  (STANDING):  acetaminophen     Tablet .. 975 milliGRAM(s) Oral <User Schedule>  ascorbic acid 500 milliGRAM(s) Oral daily  diphtheria/tetanus/pertussis (acellular) Vaccine (Adacel) 0.5 milliLiter(s) IntraMuscular once  ferrous    sulfate 325 milliGRAM(s) Oral two times a day  ibuprofen  Tablet. 600 milliGRAM(s) Oral every 6 hours  labetalol 300 milliGRAM(s) Oral three times a day  prenatal multivitamin 1 Tablet(s) Oral daily  senna 2 Tablet(s) Oral at bedtime  sodium chloride 0.9% lock flush 3 milliLiter(s) IV Push every 8 hours  sodium chloride 0.9% lock flush 3 milliLiter(s) IV Push every 8 hours

## 2025-06-11 NOTE — CHART NOTE - NSCHARTNOTEFT_GEN_A_CORE
Contacted by RN due to patient reporting chest pressure and being tachy to 120s. Pt seen and evaluated at bedside. Patient reports the chest tightness to be due to "anxiety attack". Patient has been feeling anxious about sPEC diagnosis and elevated BPs. Patient denies chest pain, SOB, jaw pain, L arm pain. O2 sat 99%. No calf tenderness or erythema. EKG performed and shows sinus tachy 105bpm. Patient reports improvement in symptoms at this time.   - will contact cardio to read and confirm normal EKG     D/w Dr. Yuko Weiss PGY1
Full note to follow  Patient seen and examined  would start Labetalol 300 mg TID now, give Procardia 30 mg tomorrow  will follow with you    Akanksha Spencer MD
1313    Pt is a 41yo  @ 37w2d who presented after having an elevated BP in the office and was found to have severe range BPs upon presentation. Patient additionally reports x2-3 days of worsening headache. She was given IV Tylenol, Reglan IVP, and Benadryl IV prior to presentation. Patient was also given Labetalol 20mg IVP at time of evaluation    Does note some improvement in her headache, although it is bad a baseline. Patient reports having had struggled with headaches through the pregnancy which she has followed with a neurologist and has had work-up including MRI and EEG, all of which is reportedly negative (Neuro=Dr. Quiroz). She mentions she struggled with headaches with the prior pregnancy but that it was not as bad. Denies any RUQ pain, epigastric pain, chest pain, shortness of breath, light-headedness, or dizziness    Fetal movement (+)  Leakage of fluid (-)  Contractions (-)  Vaginal bleeding (-)  GBS neg  Estimated fetal weight: 3200g, extrapolated from 36wk sono    Prenatal care complicated by:  - Chronic migraines  - GDMA1   - IVF    OBHx:  - 2019. . FT. 8#7oz. Uncomplicated   GynHx: Fibroid 3cm anterior subserosal +posterior intramural 2cm, Polypectomy  PMHx: Denies aside from above headache disorder   PSHx: Above   Med: PNV, bASA, Tylenol, Reglan, Benadryl   All: NKDA    Vital Signs Last 24 Hrs  T(C): 37 (2025 11:34), Max: 37 (2025 11:34)  T(F): 98.6 (2025 11:34), Max: 98.6 (2025 11:34)  HR: 77 (2025 13:25) (73 - 89)  BP: 134/86 (2025 13:25) (134/86 - 170/93)  BP(mean): --  RR: 16 (2025 11:34) (16 - 16)  SpO2: 100% (2025 13:23) (98% - 100%)        SVE: 1/60/-3 (by ALIZA Vences NP)  FHT: 130/mod/(+)accels/(-)decels  [place]: Irreg   Sono: Vertex    A/P: Pt is a 41yo  @ 37w2d who was admitted for induction of labor in the setting of PEC with severe features    We had an extensive discussion about primary elective c/s because Ms. Wei inquired about the role of one. Patient denies ever being told by either her ob or neurologist that she needs to have a c/s. Nevertheless, she states she had a consult with a [facility] who approved her for a primary elective pCS at 38wks. We reviewed that there is no clear indication for primary  delivery, especially in the setting of a previous vaginal delivery and a fetus who is currently in cephalic presentation. I reviewed the recovery of c/s as opposed to a vaginal delivery .Patient was amendable to proceeding with an induction of labor    Discussed the diagnosis of PEC w/ severe features and that she will be started on Mg for seizure prophylaxis. Informed patient that given meeting criteria, she is at increased risk of developing PEC in future pregnancies. In addition, she was informed that she is at increased risk of developing hypertension and cardiovascular disease outside of pregnancy. All questions were answered to the patient's apparent satisfaction    - For vaginal Misoprostol and cervical balloon  - S/p Labetalol 20mg IVP. For Nifedpine 30mg Xl qd and Mg for seizure ppx  - Having a boy. Desires a circumcision. Declines postpartum contraception     Bipin Bonilla, PGY-3  Obstetrics and Gynecology    Discussed with Dr. JOURDAN Raygoza

## 2025-06-12 VITALS
TEMPERATURE: 99 F | RESPIRATION RATE: 18 BRPM | OXYGEN SATURATION: 100 % | DIASTOLIC BLOOD PRESSURE: 88 MMHG | SYSTOLIC BLOOD PRESSURE: 138 MMHG | HEART RATE: 88 BPM

## 2025-06-12 LAB
ALBUMIN SERPL ELPH-MCNC: 3.5 G/DL — SIGNIFICANT CHANGE UP (ref 3.3–5)
ALP SERPL-CCNC: 74 U/L — SIGNIFICANT CHANGE UP (ref 40–120)
ALT FLD-CCNC: 66 U/L — HIGH (ref 4–33)
ANION GAP SERPL CALC-SCNC: 10 MMOL/L — SIGNIFICANT CHANGE UP (ref 7–14)
AST SERPL-CCNC: 29 U/L — SIGNIFICANT CHANGE UP (ref 4–32)
BILIRUB SERPL-MCNC: 0.2 MG/DL — SIGNIFICANT CHANGE UP (ref 0.2–1.2)
BUN SERPL-MCNC: 15 MG/DL — SIGNIFICANT CHANGE UP (ref 7–23)
CALCIUM SERPL-MCNC: 8.7 MG/DL — SIGNIFICANT CHANGE UP (ref 8.4–10.5)
CHLORIDE SERPL-SCNC: 100 MMOL/L — SIGNIFICANT CHANGE UP (ref 98–107)
CO2 SERPL-SCNC: 23 MMOL/L — SIGNIFICANT CHANGE UP (ref 22–31)
CREAT SERPL-MCNC: 0.61 MG/DL — SIGNIFICANT CHANGE UP (ref 0.5–1.3)
EGFR: 115 ML/MIN/1.73M2 — SIGNIFICANT CHANGE UP
EGFR: 115 ML/MIN/1.73M2 — SIGNIFICANT CHANGE UP
GLUCOSE SERPL-MCNC: 116 MG/DL — HIGH (ref 70–99)
POTASSIUM SERPL-MCNC: 3.9 MMOL/L — SIGNIFICANT CHANGE UP (ref 3.5–5.3)
POTASSIUM SERPL-SCNC: 3.9 MMOL/L — SIGNIFICANT CHANGE UP (ref 3.5–5.3)
PROT SERPL-MCNC: 6.4 G/DL — SIGNIFICANT CHANGE UP (ref 6–8.3)
SODIUM SERPL-SCNC: 133 MMOL/L — LOW (ref 135–145)

## 2025-06-12 RX ORDER — LABETALOL HYDROCHLORIDE 200 MG/1
1 TABLET, FILM COATED ORAL
Qty: 90 | Refills: 1
Start: 2025-06-12 | End: 2025-08-10

## 2025-06-12 RX ADMIN — Medication 1 TABLET(S): at 12:02

## 2025-06-12 RX ADMIN — LABETALOL HYDROCHLORIDE 300 MILLIGRAM(S): 200 TABLET, FILM COATED ORAL at 05:43

## 2025-06-12 RX ADMIN — Medication 600 MILLIGRAM(S): at 12:02

## 2025-06-12 RX ADMIN — Medication 325 MILLIGRAM(S): at 05:43

## 2025-06-12 RX ADMIN — Medication 975 MILLIGRAM(S): at 00:44

## 2025-06-12 RX ADMIN — Medication 975 MILLIGRAM(S): at 06:25

## 2025-06-12 RX ADMIN — Medication 600 MILLIGRAM(S): at 12:30

## 2025-06-12 RX ADMIN — LABETALOL HYDROCHLORIDE 300 MILLIGRAM(S): 200 TABLET, FILM COATED ORAL at 13:38

## 2025-06-12 RX ADMIN — Medication 500 MILLIGRAM(S): at 12:02

## 2025-06-12 RX ADMIN — Medication 975 MILLIGRAM(S): at 05:43

## 2025-06-12 NOTE — PROVIDER CONTACT NOTE (OTHER) - DATE AND TIME:
09-Jun-2025 20:45
12-Jun-2025 10:30
07-Jun-2025 12:25
09-Jun-2025 16:15
11-Jun-2025 07:28
08-Jun-2025 08:25

## 2025-06-12 NOTE — PROVIDER CONTACT NOTE (OTHER) - ASSESSMENT
Vitals @ 10am = 102/74 94bpm WNL  Patient denies any epigastric pain or visual disturbances. pt states h/a is always present. pt denies any SOB or chest pain at this time.

## 2025-06-12 NOTE — PROVIDER CONTACT NOTE (OTHER) - REASON
pt with elevated bp and HR
low calcium level
pt has elevated BP upon discharge
low resp rate
pt c/o dizziness and weakness
pt with elevated bp

## 2025-06-12 NOTE — PROVIDER CONTACT NOTE (OTHER) - SITUATION
/92 
Pt calcium level 6.5
Pt resp rate 10
pt c/o dizziness and weakness as pt states this is a new finding as her blood pressures are lower
pt given an additional dose of procardia 30xl a 1830, as per md bp to be checked at 2045, bp showed 149/86 md notified
pt has elevated BP of 156/87 upon d/c

## 2025-06-12 NOTE — PROVIDER CONTACT NOTE (OTHER) - BACKGROUND
@ 37.3wks on  @ 0244 w/ PEC
nsd from 6/7 at 0244  gthn then became pec w severe features sp mag   procardia 30xl daily at 1400 and then an additional procardia 30xl given at 1830
 6/7 w/ sPEC s/p mag
Pt s/p  25, IOL for PEC, s/p magnesium sulfate.
Pt s/p vaginal delivery 6/7/25, IOL for for PEC, receiving magnesium sulfate infusion
nsd from 6/7 at 0244  gthn then became pec w severe features sp mag   procardia 90xl daily at 1400

## 2025-06-12 NOTE — PROGRESS NOTE ADULT - SUBJECTIVE AND OBJECTIVE BOX
OB Progress Note: Vaginal Delivery     S: 39yo PPD#1 s/p  c/b sPEC. Her pain is well controlled. She is tolerating a regular diet. Denies N/V. Denies CP/SOB/lightheadedness/dizziness.   She is ambulating without difficulty.   Voiding spontaneously.     O:   Vital Signs Last 24 Hrs  T(C): 36.6 (2025 05:25), Max: 37 (2025 15:28)  T(F): 97.8 (2025 05:25), Max: 98.6 (2025 15:28)  HR: 80 (2025 05:25) (60 - 100)  BP: 133/87 (2025 05:25) (129/74 - 143/81)  BP(mean): --  RR: 17 (2025 05:25) (10 - 22)  SpO2: 100% (2025 05:25) (96% - 100%)    Parameters below as of 2025 05:25  Patient On (Oxygen Delivery Method): room air        Labs:  Blood type: B Positive  Rubella IgG: RPR: Negative                          8.6[L]   13.21[H] >-----------< 286    (  @ 05:20 )             25.5[L]                        10.8[L]   10.87[H] >-----------< 331    (  @ 11:58 )             32.5[L]    25 @ 05:20      136  |  103  |  7   ----------------------------<  93  4.1   |  21[L]  |  0.53    25 @ 11:58      133[L]  |  104  |  12  ----------------------------<  80  4.5   |  20[L]  |  0.58        Ca    6.5[LL]      2025 05:20  Ca    8.7      2025 11:58  Mg     6.00[H]     06-07  Mg     6.10[H]     06-07  Mg     6.20[H]     06-07  Mg     5.90[H]     06-07  Mg     5.30[H]     06-06    TPro  5.6[L]  /  Alb  2.9[L]  /  TBili  <0.2  /  DBili  x   /  AST  16  /  ALT  13  /  AlkPhos  69  25 @ 05:20  TPro  6.7  /  Alb  3.4  /  TBili  0.2  /  DBili  x   /  AST  19  /  ALT  15  /  AlkPhos  79  25 @ 11:58          PE:  General: NAD  Abdomen: Mildly distended, appropriately tender, fundus firm   Extremities: No erythema, no pitting edema    
OB Progress Note: Vaginal Delivery     S: 41yo PPD#4 s/p  c/b sPEC. Her pain is well controlled. She is tolerating a regular diet. Denies N/V. Denies CP/SOB/lightheadedness/dizziness. She is ambulating without difficulty. Voiding spontaneously.     O:   Vital Signs Last 24 Hrs  T(C): 36.7 (2025 07:20), Max: 37.2 (10 Kalyan 2025 18:03)  T(F): 98 (2025 07:20), Max: 98.9 (10 Kalyan 2025 18:03)  HR: 108 (2025 07:20) (90 - 127)  BP: 139/92 (2025 07:20) (134/83 - 153/86)  BP(mean): --  RR: 18 (2025 07:20) (17 - 19)  SpO2: 96% (2025 07:20) (96% - 100%)    Parameters below as of 2025 07:20  Patient On (Oxygen Delivery Method): room air        Labs:  Blood type: B Positive  Rubella IgG: RPR: Negative    PE:  General: NAD  Abdomen: Mildly distended, appropriately tender, fundus firm   Extremities: No erythema, no pitting edema    
OB Progress Note: Vaginal Delivery     S: 39yo PPD#2 s/p  c/b sPEC. Her pain is well controlled. She is tolerating a regular diet. Denies N/V. Denies CP/SOB/lightheadedness/dizziness. She is ambulating without difficulty. Voiding spontaneously.     O:   Vital Signs Last 24 Hrs  T(C): 36.7 (2025 06:50), Max: 37.2 (2025 02:15)  T(F): 98 (2025 06:50), Max: 98.9 (2025 02:15)  HR: 90 (2025 06:50) (83 - 93)  BP: 124/80 (2025 06:50) (123/76 - 143/87)  BP(mean): --  RR: 19 (2025 06:50) (16 - 19)  SpO2: 100% (2025 06:50) (96% - 100%)    Parameters below as of 2025 06:50  Patient On (Oxygen Delivery Method): room air        Labs:  Blood type: B Positive  Rubella IgG: RPR: Negative                          8.6[L]   13.21[H] >-----------< 286    (  @ 05:20 )             25.5[L]                        10.8[L]   10.87[H] >-----------< 331    (  @ 11:58 )             32.5[L]    25 @ 05:20      136  |  103  |  7   ----------------------------<  93  4.1   |  21[L]  |  0.53    25 @ 11:58      133[L]  |  104  |  12  ----------------------------<  80  4.5   |  20[L]  |  0.58        Ca    6.5[LL]      2025 05:20  Ca    8.7      2025 11:58  Mg     6.00[H]     06-07  Mg     6.10[H]     06-07  Mg     6.20[H]     06-07  Mg     5.90[H]     06-07  Mg     5.30[H]     06-06    TPro  5.6[L]  /  Alb  2.9[L]  /  TBili  <0.2  /  DBili  x   /  AST  16  /  ALT  13  /  AlkPhos  69  25 @ 05:20  TPro  6.7  /  Alb  3.4  /  TBili  0.2  /  DBili  x   /  AST  19  /  ALT  15  /  AlkPhos  79  25 @ 11:58        PE:  General: NAD  Abdomen: Mildly distended, nontender, fundus firm   Extremities: No erythema, no pitting edema    
OB Progress Note: Vaginal Delivery     S: 41yo PPD#5 s/p  c/b sPEC. Her pain is well controlled. She is tolerating a regular diet. Denies N/V. Denies CP/SOB/lightheadedness/dizziness. She is ambulating without difficulty. Voiding spontaneously.     O:   Vital Signs Last 24 Hrs  T(C): 37 (2025 05:50), Max: 37.1 (2025 10:40)  T(F): 98.6 (2025 05:50), Max: 98.8 (2025 10:40)  HR: 93 (2025 05:50) (93 - 118)  BP: 102/67 (2025 05:50) (102/67 - 140/80)  BP(mean): --  RR: 18 (2025 05:50) (16 - 19)  SpO2: 99% (2025 05:50) (99% - 100%)    Parameters below as of 2025 05:50  Patient On (Oxygen Delivery Method): room air        Labs:  Blood type: B Positive  Rubella IgG: RPR: Negative                          10.2[L]   11.42[H] >-----------< 415[H]    (  @ 06:00 )             32.2[L]    25 @ 08:20      135  |  100  |  11  ----------------------------<  85  4.4   |  23  |  0.59        Ca    9.0      2025 08:20    TPro  7.1  /  Alb  3.6  /  TBili  0.3  /  DBili  x   /  AST  77[H]  /  ALT  108[H]  /  AlkPhos  90  25 @ 08:20          PE:  General: NAD  Abdomen: Mildly distended, appropriately tender, fundus firm   Extremities: No erythema, no pitting edema    
POD #1 s/p vaginal divery. Patient is doing well, OOBAA. Tolerating clears. Pain is  tolerable. No residual anesthetic issues or complications noted. - Earl, CRNA
OB Progress Note: Vaginal Delivery     S: 41yo PPD#3 s/p  c/b sPEC. Her pain is well controlled. She is tolerating a regular diet. Denies N/V. Denies CP/SOB/lightheadedness/dizziness. She is ambulating without difficulty. Voiding spontaneously.     O:  Vitals:  Vital Signs Last 24 Hrs  T(C): 37.2 (10 Kalyan 2025 06:35), Max: 37.2 (10 Kalyan 2025 06:35)  T(F): 99 (10 Kalyan 2025 06:35), Max: 99 (10 Kalyan 2025 06:35)  HR: 94 (10 Kalyan 2025 06:35) (74 - 116)  BP: 146/95 (10 Kalyan 2025 06:35) (114/81 - 156/87)  BP(mean): --  RR: 18 (10 Kalyan 2025 06:35) (14 - 18)  SpO2: 99% (10 Kalyan 2025 06:35) (95% - 100%)    Parameters below as of 10 Kalyan 2025 06:35  Patient On (Oxygen Delivery Method): room air        MEDICATIONS  (STANDING):  acetaminophen     Tablet .. 975 milliGRAM(s) Oral <User Schedule>  ascorbic acid 500 milliGRAM(s) Oral daily  diphtheria/tetanus/pertussis (acellular) Vaccine (Adacel) 0.5 milliLiter(s) IntraMuscular once  ferrous    sulfate 325 milliGRAM(s) Oral two times a day  ibuprofen  Tablet. 600 milliGRAM(s) Oral every 6 hours  NIFEdipine XL 60 milliGRAM(s) Oral daily  prenatal multivitamin 1 Tablet(s) Oral daily  senna 2 Tablet(s) Oral at bedtime  sodium chloride 0.9% lock flush 3 milliLiter(s) IV Push every 8 hours  sodium chloride 0.9% lock flush 3 milliLiter(s) IV Push every 8 hours    MEDICATIONS  (PRN):  benzocaine 20%/menthol 0.5% Spray 1 Spray(s) Topical every 6 hours PRN for Perineal discomfort  dibucaine 1% Ointment 1 Application(s) Topical every 6 hours PRN Perineal discomfort  diphenhydrAMINE 25 milliGRAM(s) Oral every 6 hours PRN Pruritus  diphenhydrAMINE 25 milliGRAM(s) Oral every 4 hours PRN Rash and/or Itching  hydrocortisone 1% Cream 1 Application(s) Topical every 6 hours PRN Moderate Pain (4-6)  lanolin Ointment 1 Application(s) Topical every 6 hours PRN nipple soreness  magnesium hydroxide Suspension 30 milliLiter(s) Oral two times a day PRN Constipation  oxyCODONE    IR 5 milliGRAM(s) Oral every 3 hours PRN Moderate to Severe Pain (4-10)  oxyCODONE    IR 5 milliGRAM(s) Oral once PRN Moderate to Severe Pain (4-10)  pramoxine 1%/zinc 5% Cream 1 Application(s) Topical every 4 hours PRN Moderate Pain (4-6)  simethicone 80 milliGRAM(s) Chew every 4 hours PRN Gas  witch hazel Pads 1 Application(s) Topical every 4 hours PRN Perineal discomfort      LABS:  Blood type: B Positive  Rubella IgG: RPR: Negative                          8.6[L]   13.21[H] >-----------< 286    (  @ 05:20 )             25.5[L]    25 @ 05:20      136  |  103  |  7   ----------------------------<  93  4.1   |  21[L]  |  0.53        Ca    6.5[LL]      2025 05:20  Mg     6.00[H]     06-07  Mg     6.10[H]     06-07    TPro  5.6[L]  /  Alb  2.9[L]  /  TBili  <0.2  /  DBili  x   /  AST  16  /  ALT  13  /  AlkPhos  69  25 @ 05:20          Physical exam:  Gen: NAD  Abdomen: Soft, nontender, no distension , firm uterine fundus at umbilicus.  Pelvic: Normal lochia noted  Ext: No calf tenderness

## 2025-06-12 NOTE — PROGRESS NOTE ADULT - ASSESSMENT
A/P: 39yo PPD#3 s/p  c/b sPEC. Patient is stable and doing well post-partum.    #sPEC  - s/p Mg for seizure prophylaxis   - HELLP wnl   - p/c 0.3  - s/p Procardia 30  - patient started on Procardia 60XL yesterday  due to BPs 140s-150s/80s-90s    #PP    - Continue regular diet.  - Increase ambulation.  - Continue motrin, tylenol, oxycodone PRN for pain control.  - H/H: 10.8/32.5->8.6/25.5  - declines PP contraception     Machelle Weiss, PGY1
A/P: 39yo PPD#2 s/p  c/b sPEC.  Patient is stable and doing well post-partum.    #sPEC  - s/p Mg for seizure prophylaxis   - continue w/ procardia 30 XL  - HELLP wnl   - p/c 0.3  - BP overnight 120s/70-80s    #PP    - Continue regular diet.  - Increase ambulation.  - Continue motrin, tylenol, oxycodone PRN for pain control.  - H/H: 10.8/32.5->8.6/25.5    Machelle Weiss, PGY1
A/P: 39yo PPD#4 s/p  c/b sPEC. Patient is stable and doing well post-partum.    #sPEC  - s/p Mg for seizure prophylaxis   - HELLP wnl   - p/c 0.3  - s/p [id number] and [id number]  - patient started on Procardia 90XL yesterday 6/10 due to BPs 140s-150s/80s-90s  - BP remains poorly controlled. consider consulting Cardio OB for antihypertensive regimen     #PP    - Continue regular diet.  - Increase ambulation.  - Continue motrin, tylenol, oxycodone PRN for pain control.  - H/H: 10.8/32.5->8.6/25.5  - declines PP contraception     Machelle Weiss, PGY1
A/P: 41yo PPD#1 s/p  c/b sPEC.  Patient is stable and doing well post-partum.    #sPEC  - s/p Mg for seizure prophylaxis   - continue w/ procardia 20   - HELLP wnl   - p/c 0.3  - BP overnight 130s/70-80s    #PP    - Continue regular diet.  - Increase ambulation.  - Continue motrin, tylenol, oxycodone PRN for pain control.  - H/H: 10.8/32.5->8.6/25.5    Machelle Weiss, PGY1
A/P: 41yo PPD#5 s/p  c/b sPEC. Patient is stable and doing well post-partum.    #sPEC  - s/p Mg for seizure prophylaxis   - HELLP wnl   - p/c 0.3  - s/p Pro30(), Pro60(), Pro90(6/10-)  - cardio OB recs: stop procardia 90XL, start Labetalol 300TID, start procardia 30 QD    #PP    - Continue regular diet.  - Increase ambulation.  - Continue motrin, tylenol, oxycodone PRN for pain control.  - H/H: 10.8/32.5->8.6/25.5  - declines PP contraception     Machelle Weiss, PGY1

## 2025-06-13 PROBLEM — G43.909 MIGRAINE, UNSPECIFIED, NOT INTRACTABLE, WITHOUT STATUS MIGRAINOSUS: Chronic | Status: ACTIVE | Noted: 2025-06-06

## 2025-06-18 ENCOUNTER — APPOINTMENT (OUTPATIENT)
Dept: CARDIOLOGY | Facility: CLINIC | Age: 40
End: 2025-06-18

## 2025-06-18 ENCOUNTER — NON-APPOINTMENT (OUTPATIENT)
Age: 40
End: 2025-06-18

## 2025-06-18 VITALS
HEIGHT: 64 IN | DIASTOLIC BLOOD PRESSURE: 80 MMHG | BODY MASS INDEX: 25.18 KG/M2 | HEART RATE: 89 BPM | WEIGHT: 147.5 LBS | OXYGEN SATURATION: 99 % | RESPIRATION RATE: 16 BRPM | SYSTOLIC BLOOD PRESSURE: 112 MMHG | TEMPERATURE: 98.4 F

## 2025-06-18 PROCEDURE — 99213 OFFICE O/P EST LOW 20 MIN: CPT | Mod: 25

## 2025-06-18 PROCEDURE — 93000 ELECTROCARDIOGRAM COMPLETE: CPT

## 2025-07-03 PROBLEM — D21.9 BENIGN NEOPLASM OF CONNECTIVE AND OTHER SOFT TISSUE, UNSPECIFIED: Chronic | Status: ACTIVE | Noted: 2025-06-06
